# Patient Record
Sex: FEMALE | Race: WHITE | Employment: FULL TIME | URBAN - NONMETROPOLITAN AREA
[De-identification: names, ages, dates, MRNs, and addresses within clinical notes are randomized per-mention and may not be internally consistent; named-entity substitution may affect disease eponyms.]

---

## 2018-01-03 ENCOUNTER — OFFICE VISIT (OUTPATIENT)
Dept: RHEUMATOLOGY | Age: 23
End: 2018-01-03
Payer: COMMERCIAL

## 2018-01-03 VITALS
HEART RATE: 96 BPM | DIASTOLIC BLOOD PRESSURE: 63 MMHG | HEIGHT: 61 IN | RESPIRATION RATE: 16 BRPM | WEIGHT: 128.2 LBS | SYSTOLIC BLOOD PRESSURE: 110 MMHG | BODY MASS INDEX: 24.2 KG/M2

## 2018-01-03 DIAGNOSIS — R76.8 RHEUMATOID FACTOR POSITIVE: ICD-10-CM

## 2018-01-03 DIAGNOSIS — M25.50 POLYARTHRALGIA: Primary | ICD-10-CM

## 2018-01-03 PROCEDURE — 99244 OFF/OP CNSLTJ NEW/EST MOD 40: CPT | Performed by: INTERNAL MEDICINE

## 2018-01-03 RX ORDER — SULFAMETHOXAZOLE AND TRIMETHOPRIM 800; 160 MG/1; MG/1
1 TABLET ORAL EVERY 12 HOURS
COMMUNITY
End: 2018-02-15 | Stop reason: ALTCHOICE

## 2018-01-03 RX ORDER — PREDNISONE 10 MG/1
TABLET ORAL
Qty: 30 TABLET | Refills: 0 | Status: SHIPPED | OUTPATIENT
Start: 2018-01-03 | End: 2018-02-15 | Stop reason: ALTCHOICE

## 2018-01-03 RX ORDER — CEPHALEXIN 500 MG/1
500 CAPSULE ORAL 2 TIMES DAILY
COMMUNITY
End: 2018-02-15 | Stop reason: ALTCHOICE

## 2018-01-03 ASSESSMENT — ENCOUNTER SYMPTOMS
RESPIRATORY NEGATIVE: 1
EYES NEGATIVE: 1
GASTROINTESTINAL NEGATIVE: 1

## 2018-01-05 LAB
ALBUMIN SERPL-MCNC: 3.7 G/DL
ALP BLD-CCNC: 64 U/L
ALT SERPL-CCNC: 16 U/L
ANION GAP SERPL CALCULATED.3IONS-SCNC: 12 MMOL/L
AST SERPL-CCNC: 18 U/L
BASOPHILS ABSOLUTE: NORMAL /ΜL
BASOPHILS RELATIVE PERCENT: NORMAL %
BILIRUB SERPL-MCNC: 0.3 MG/DL (ref 0.1–1.4)
BUN BLDV-MCNC: 12 MG/DL
CALCIUM SERPL-MCNC: 8.5 MG/DL
CHLORIDE BLD-SCNC: 105 MMOL/L
CO2: 27 MMOL/L
CREAT SERPL-MCNC: 0.8 MG/DL
EOSINOPHILS ABSOLUTE: NORMAL /ΜL
EOSINOPHILS RELATIVE PERCENT: NORMAL %
GFR CALCULATED: NORMAL
GLUCOSE BLD-MCNC: 82 MG/DL
HCT VFR BLD CALC: 37.6 % (ref 36–46)
HEMOGLOBIN: 13 G/DL (ref 12–16)
LYMPHOCYTES ABSOLUTE: 2.3 /ΜL
LYMPHOCYTES RELATIVE PERCENT: 40.1 %
MCH RBC QN AUTO: 29 PG
MCHC RBC AUTO-ENTMCNC: 34.6 G/DL
MCV RBC AUTO: 83.7 FL
MONOCYTES ABSOLUTE: NORMAL /ΜL
MONOCYTES RELATIVE PERCENT: NORMAL %
NEUTROPHILS ABSOLUTE: 3 /ΜL
NEUTROPHILS RELATIVE PERCENT: 51.7 %
PDW BLD-RTO: 39.1 %
PLATELET # BLD: 178 K/ΜL
PMV BLD AUTO: 9.3 FL
POTASSIUM SERPL-SCNC: 3.6 MMOL/L
RBC # BLD: 4.49 10^6/ΜL
RHEUMATOID FACTOR: 14
SODIUM BLD-SCNC: 140 MMOL/L
TOTAL PROTEIN: 7.2
WBC # BLD: 5.8 10^3/ML

## 2018-01-09 ENCOUNTER — TELEPHONE (OUTPATIENT)
Dept: RHEUMATOLOGY | Age: 23
End: 2018-01-09

## 2018-01-09 NOTE — TELEPHONE ENCOUNTER
----- Message from Alexis Estrada DO sent at 1/8/2018  5:34 PM EST -----  Please call and inform the pt that the x-ray of the hand revealed no significant abnormalities. The blood testing was negative for systemic lupus. The other blood test to evaluate for rheumatoid arthritis cyclic citrullinated peptide (CCP) was negative. The screening test for viral hepatitis was negative. The blood test to evaluate for inflammation was within normal limits. Please ask the patient if the prednisone taper helped with the joint pains. If she has noted relief how much relief has she had?

## 2018-01-09 NOTE — TELEPHONE ENCOUNTER
Pt notified and she states she has not really noticed a difference with the joint pains since taking the prednisone taper and she has been taking it since Friday.  Please advise

## 2018-02-15 ENCOUNTER — OFFICE VISIT (OUTPATIENT)
Dept: RHEUMATOLOGY | Age: 23
End: 2018-02-15
Payer: COMMERCIAL

## 2018-02-15 VITALS
DIASTOLIC BLOOD PRESSURE: 63 MMHG | BODY MASS INDEX: 24.13 KG/M2 | HEART RATE: 98 BPM | SYSTOLIC BLOOD PRESSURE: 106 MMHG | WEIGHT: 127.8 LBS | RESPIRATION RATE: 16 BRPM | HEIGHT: 61 IN

## 2018-02-15 DIAGNOSIS — Z51.81 MEDICATION MONITORING ENCOUNTER: Primary | ICD-10-CM

## 2018-02-15 DIAGNOSIS — Z00.00 HEALTHCARE MAINTENANCE: ICD-10-CM

## 2018-02-15 DIAGNOSIS — M05.79 RHEUMATOID ARTHRITIS INVOLVING MULTIPLE SITES WITH POSITIVE RHEUMATOID FACTOR (HCC): ICD-10-CM

## 2018-02-15 PROCEDURE — 99214 OFFICE O/P EST MOD 30 MIN: CPT | Performed by: INTERNAL MEDICINE

## 2018-02-15 PROCEDURE — 96372 THER/PROPH/DIAG INJ SC/IM: CPT | Performed by: INTERNAL MEDICINE

## 2018-02-15 RX ORDER — METHYLPREDNISOLONE ACETATE 80 MG/ML
80 INJECTION, SUSPENSION INTRA-ARTICULAR; INTRALESIONAL; INTRAMUSCULAR; SOFT TISSUE ONCE
Status: COMPLETED | OUTPATIENT
Start: 2018-02-15 | End: 2018-02-15

## 2018-02-15 RX ORDER — OLOPATADINE HYDROCHLORIDE 2 MG/ML
1 SOLUTION/ DROPS OPHTHALMIC DAILY
COMMUNITY
End: 2021-09-13

## 2018-02-15 RX ORDER — SULFASALAZINE 500 MG/1
TABLET ORAL
Qty: 120 TABLET | Refills: 0 | Status: SHIPPED | OUTPATIENT
Start: 2018-02-15 | End: 2018-03-05 | Stop reason: SDUPTHER

## 2018-02-15 RX ADMIN — METHYLPREDNISOLONE ACETATE 80 MG: 80 INJECTION, SUSPENSION INTRA-ARTICULAR; INTRALESIONAL; INTRAMUSCULAR; SOFT TISSUE at 14:01

## 2018-02-15 ASSESSMENT — ENCOUNTER SYMPTOMS
RESPIRATORY NEGATIVE: 1
GASTROINTESTINAL NEGATIVE: 1
EYES NEGATIVE: 1

## 2018-02-15 NOTE — PROGRESS NOTES
PAST MEDICAL HISTORY  Past Medical History:   Diagnosis Date    Arthritis, rheumatoid ([address]        SOCIAL HISTORY  Social History     Social History    Marital status: Legally      Spouse name: N/A    Number of children: N/A    Years of education: N/A     Social History Main Topics    Smoking status: Never Smoker    Smokeless tobacco: Never Used    Alcohol use No    Drug use: No    Sexual activity: Not Asked     Other Topics Concern    None     Social History Narrative    None       FAMILY HISTORY  Family History   Problem Relation Age of Onset    No Known Problems Mother     High Blood Pressure Father     Arthritis Father     Lupus Maternal Grandmother     Lupus Paternal Grandmother     Arthritis Paternal Grandmother        SURGICAL HISTORY  Past Surgical History:   Procedure Laterality Date    WRIST ARTHROSCOPY Left 04/2017       ALLERGIES  No Known Allergies    CURRENT MEDICATIONS  Current Outpatient Prescriptions   Medication Sig Dispense Refill    olopatadine (PATADAY) 0.2 % SOLN ophthalmic solution 1 drop daily As needed       No current facility-administered medications for this visit. Objective:  /63   Pulse 98   Resp 16   Ht 5' 0.98\" (1.549 m)   Wt 127 lb 12.8 oz (58 kg)   BMI 24.16 kg/m²     General: No distress. Alert. Eyes: PERRL. No sclera icterus. No conjunctival injection. ENT: No discharge. Pharynx clear. Neck: Trachea midline. Normal thyroid. Resp: No accessory muscle use. No crackles. No wheezing. No rhonchi. No dullness on percussion. CV: Regular rate. Regular rhythm. No mumur or rub. No edema. M/S:   Upper extremities:  Muscle strength 5/5, FROM, No Synovitis   Fingers: Tender PIP Right 2-5, Left 2-5. - swelling of the right PIP # 2-4, Left # 2-4   - apposition of the Rt thumb to forwarm     Elbows: tender right tochlear notch.     - hyperextension of the left elbow     Lower Extremities:   Muscle strength 5/5, FROM, No Synovitis, warmth,redness of the hips, knees, ankles, toes. - Feet: mild bone spurring along the posterior calcaneous bilaterally. Spine:   - - able to place hand on ground from standing position. - tenderness along the right lumbar perispinal musculature and left SI joint. Negative shober, occiput to wall and b/l josette testing. Neuro: DTR's 2/4 bilat and equal  Psych: Oriented to person, place, time. No anxiety or agitation. Skin: Warm and dry. No nodule on exposed extremities. - mild thicken skin along the intertriginous region of the right  right 4-5 hand and foot  Lymph: No cervical LAD. No supraclavicular LAD. RAPID 3 14.3    LABS:  CBC  Lab Results   Component Value Date    WBC 5.8 01/05/2018    RBC 4.49 01/05/2018    HGB 13.0 01/05/2018    HCT 37.6 01/05/2018    MCV 83.7 01/05/2018    MCH 29.0 01/05/2018    MCHC 34.6 01/05/2018    RDW 39.1 01/05/2018     01/05/2018       CMP  Lab Results   Component Value Date    CALCIUM 8.5 01/05/2018    LABALBU 3.7 01/05/2018     01/05/2018    K 3.6 01/05/2018    CO2 27 01/05/2018     01/05/2018    BUN 12 01/05/2018    CREATININE 0.8 01/05/2018    ALT 16 01/05/2018    AST 18 01/05/2018       HgBA1c: No components found for: HGBA1C    No results found for: TSHFT4, TSH  No results found for: VITD25      No results found for: ANASCRN  No results found for: SSA  No results found for: SSB  No results found for: ANTI-SMITH  No results found for: DSDNAAB   No results found for: ANTIRNP  No results found for: C3, C4  No results found for: CCPAB  Lab Results   Component Value Date    RF 14.0 01/05/2018       No components found for: CANCASCRN, APANCASCRN  No results found for: SEDRATE  No results found for: CRP    RADIOLOGY:   MRI: arthrogram 8/4/16:   - smalll 6mm belobed gnglion cyst extends volar and proximal to the scpholunate inteval. 2 the scapholunate and lunotrquetrial ligaments appear intact. 3. Otherwise normal left wrist MRI. No acute farcture, AVN, or bone marrow edema. RF: 14 (<13.9)  CRP 1.2 (0-3)  Uric acid 4.5  ESR: 5    RF 14 (< 13.9), negative CCP, SSA, SSB, RNP, JENNIFER, smith, DsDNA. Negative Vital hepatitis screen     XR right hand 1/5/18: normal x-ray evaluation   Assessment/ Plan:    Rheumatoid factor: acute flare.   -  (+) : very mild (+) , + joint tenderness and swelling @ PIP joints   The patient reports joint pain in fingers, wrist, lower back. Most severe pain in the wrist and fingers. Symptoms started: 2015 with pain, along the left thumb/index finger pain. She reports the joint pains progressively worsened and since spread. Recurrent bouts of joint swelling, redness,warmth. AM stiffness lasting 6-8 hours. AM stiffness improved w/ movement, Naproxen,. Recent left wrist Surgery by dr. Pat Ruiz in April 2017 and pt diagnosed with synovitis and fraide cartilage.   - MGM and PGM: SLE   - negative JENNIFER and subserologies, negative CCP   - normal ESR/CRP    - start SSZ 500mg bid then increase to 1000mg bid. Side effects of sulfasalazine discussed include but not limited to allergic reaction including De La Vega-Yovani, hepatotoxicity, nephrotoxicity, pancreatitis, cytopenias including bone marrow suppression, GI upset (nausea, vomiting, abdominal pain), LFT elevation/hepatic injury, photosensitivity, stomatitis. -  Holding on Arava, MTX    -  depomedrol 80mg IM today. Medication monitoring:   - cbc, cmp, esr, crp q 3-4 weeks with SSI initiation and titration     Health maintenance:   - discussed pneumococcal vaccinations     ? Benign hypmobility syndrome:   - pt with 3 of 9 Beighton score               No Follow-up on file. Electronically signed by Cari Echevarria DO on 2/15/2018 at 1:29 PM      Thank you for allowing me to participate in the care of this patient. Please call if there are any questions.

## 2018-02-23 LAB
ALBUMIN SERPL-MCNC: 3.9 G/DL
ALP BLD-CCNC: 59 U/L
ALT SERPL-CCNC: 17 U/L
ANION GAP SERPL CALCULATED.3IONS-SCNC: 1.1 MMOL/L
AST SERPL-CCNC: 14 U/L
BASOPHILS ABSOLUTE: 0.02 /ΜL
BASOPHILS RELATIVE PERCENT: 0.3 %
BILIRUB SERPL-MCNC: 0.4 MG/DL (ref 0.1–1.4)
BUN BLDV-MCNC: 13 MG/DL
C-REACTIVE PROTEIN: 0.8
CALCIUM SERPL-MCNC: 8.8 MG/DL
CHLORIDE BLD-SCNC: 104 MMOL/L
CO2: 27 MMOL/L
CREAT SERPL-MCNC: 0.7 MG/DL
EOSINOPHILS ABSOLUTE: 0.16 /ΜL
EOSINOPHILS RELATIVE PERCENT: 2.2 %
GFR CALCULATED: NORMAL
GLUCOSE BLD-MCNC: 91 MG/DL
HCT VFR BLD CALC: 39.7 % (ref 36–46)
HEMOGLOBIN: 13.1 G/DL (ref 12–16)
LYMPHOCYTES ABSOLUTE: 2.08 /ΜL
LYMPHOCYTES RELATIVE PERCENT: 28 %
MCH RBC QN AUTO: 28.6 PG
MCHC RBC AUTO-ENTMCNC: 33 G/DL
MCV RBC AUTO: 86.7 FL
MONOCYTES ABSOLUTE: 0.69 /ΜL
MONOCYTES RELATIVE PERCENT: 9.3 %
NEUTROPHILS ABSOLUTE: 4.48 /ΜL
NEUTROPHILS RELATIVE PERCENT: 60.2 %
PDW BLD-RTO: 37.4 %
PLATELET # BLD: 193 K/ΜL
PMV BLD AUTO: 10.2 FL
POTASSIUM SERPL-SCNC: 3.6 MMOL/L
RBC # BLD: 4.58 10^6/ΜL
SEDIMENTATION RATE, ERYTHROCYTE: 2
SODIUM BLD-SCNC: 141 MMOL/L
TOTAL PROTEIN: 7.5
WBC # BLD: 7.43 10^3/ML

## 2018-03-02 LAB
ALBUMIN SERPL-MCNC: 3.8 G/DL
ALP BLD-CCNC: 55 U/L
ALT SERPL-CCNC: 17 U/L
ANION GAP SERPL CALCULATED.3IONS-SCNC: 1.1 MMOL/L
AST SERPL-CCNC: 15 U/L
BASOPHILS ABSOLUTE: 0.03 /ΜL
BASOPHILS RELATIVE PERCENT: 0.5 %
BILIRUB SERPL-MCNC: 0.5 MG/DL (ref 0.1–1.4)
BUN BLDV-MCNC: 11 MG/DL
C-REACTIVE PROTEIN: 0.9
CALCIUM SERPL-MCNC: 8.4 MG/DL
CHLORIDE BLD-SCNC: 105 MMOL/L
CO2: 27 MMOL/L
CREAT SERPL-MCNC: 0.8 MG/DL
EOSINOPHILS ABSOLUTE: 0.09 /ΜL
EOSINOPHILS RELATIVE PERCENT: 1.5 %
GFR CALCULATED: NORMAL
GLUCOSE BLD-MCNC: 125 MG/DL
HCT VFR BLD CALC: 38 % (ref 36–46)
HEMOGLOBIN: 12.4 G/DL (ref 12–16)
LYMPHOCYTES ABSOLUTE: 1.69 /ΜL
LYMPHOCYTES RELATIVE PERCENT: 28.3 %
MCH RBC QN AUTO: 28.6 PG
MCHC RBC AUTO-ENTMCNC: 32.6 G/DL
MCV RBC AUTO: 87.6 FL
MONOCYTES ABSOLUTE: 0.54 /ΜL
MONOCYTES RELATIVE PERCENT: 9 %
NEUTROPHILS ABSOLUTE: 3.62 /ΜL
NEUTROPHILS RELATIVE PERCENT: 60.5 %
PDW BLD-RTO: 38.5 %
PLATELET # BLD: 156 K/ΜL
PMV BLD AUTO: 10.2 FL
POTASSIUM SERPL-SCNC: 3.1 MMOL/L
RBC # BLD: 4.34 10^6/ΜL
SEDIMENTATION RATE, ERYTHROCYTE: 2
SODIUM BLD-SCNC: 137 MMOL/L
TOTAL PROTEIN: 7.3
WBC # BLD: 5.98 10^3/ML

## 2018-03-05 DIAGNOSIS — M05.79 RHEUMATOID ARTHRITIS INVOLVING MULTIPLE SITES WITH POSITIVE RHEUMATOID FACTOR (HCC): ICD-10-CM

## 2018-03-05 RX ORDER — SULFASALAZINE 500 MG/1
TABLET ORAL
Qty: 120 TABLET | Refills: 0 | Status: SHIPPED | OUTPATIENT
Start: 2018-03-05 | End: 2018-03-29 | Stop reason: SDUPTHER

## 2018-03-05 NOTE — PROGRESS NOTES
Labs revealed a mild low potassium and calcium levels.  LAbs otherwise stable  - continue SSZ 1,000mg bid

## 2018-03-09 LAB
ALBUMIN SERPL-MCNC: 4.1 G/DL
ALP BLD-CCNC: 59 U/L
ALT SERPL-CCNC: 20 U/L
ANION GAP SERPL CALCULATED.3IONS-SCNC: 10 MMOL/L
AST SERPL-CCNC: 17 U/L
BASOPHILS ABSOLUTE: 0.02 /ΜL
BASOPHILS RELATIVE PERCENT: 0.4 %
BILIRUB SERPL-MCNC: 0.5 MG/DL (ref 0.1–1.4)
BUN BLDV-MCNC: 11 MG/DL
C-REACTIVE PROTEIN: 1.1
CALCIUM SERPL-MCNC: 8.9 MG/DL
CHLORIDE BLD-SCNC: 104 MMOL/L
CO2: 29 MMOL/L
CREAT SERPL-MCNC: 0.8 MG/DL
EOSINOPHILS ABSOLUTE: 0.07 /ΜL
EOSINOPHILS RELATIVE PERCENT: 1.5 %
GFR CALCULATED: NORMAL
GLUCOSE BLD-MCNC: 67 MG/DL
HCT VFR BLD CALC: 39.4 % (ref 36–46)
HEMOGLOBIN: 12.9 G/DL (ref 12–16)
LYMPHOCYTES ABSOLUTE: 1.58 /ΜL
LYMPHOCYTES RELATIVE PERCENT: 33.5 %
MCH RBC QN AUTO: 28.8 PG
MCHC RBC AUTO-ENTMCNC: 32.7 G/DL
MCV RBC AUTO: 87.9 FL
MONOCYTES ABSOLUTE: 0.61 /ΜL
MONOCYTES RELATIVE PERCENT: 13 %
NEUTROPHILS ABSOLUTE: 2.42 /ΜL
NEUTROPHILS RELATIVE PERCENT: 51.4 %
PDW BLD-RTO: 39.7 %
PLATELET # BLD: 155 K/ΜL
PMV BLD AUTO: 10.1 FL
POTASSIUM SERPL-SCNC: 3.4 MMOL/L
RBC # BLD: 4.48 10^6/ΜL
SEDIMENTATION RATE, ERYTHROCYTE: 6
SODIUM BLD-SCNC: 140 MMOL/L
TOTAL PROTEIN: 7.8
WBC # BLD: 4.71 10^3/ML

## 2018-03-21 ENCOUNTER — OFFICE VISIT (OUTPATIENT)
Dept: RHEUMATOLOGY | Age: 23
End: 2018-03-21
Payer: COMMERCIAL

## 2018-03-21 VITALS
DIASTOLIC BLOOD PRESSURE: 63 MMHG | OXYGEN SATURATION: 97 % | HEIGHT: 61 IN | WEIGHT: 125.8 LBS | HEART RATE: 78 BPM | BODY MASS INDEX: 23.75 KG/M2 | SYSTOLIC BLOOD PRESSURE: 102 MMHG

## 2018-03-21 DIAGNOSIS — Z51.81 MEDICATION MONITORING ENCOUNTER: ICD-10-CM

## 2018-03-21 DIAGNOSIS — M05.79 RHEUMATOID ARTHRITIS INVOLVING MULTIPLE SITES WITH POSITIVE RHEUMATOID FACTOR (HCC): Primary | ICD-10-CM

## 2018-03-21 DIAGNOSIS — M65.4 DE QUERVAIN'S TENOSYNOVITIS, BILATERAL: ICD-10-CM

## 2018-03-21 PROCEDURE — 99214 OFFICE O/P EST MOD 30 MIN: CPT | Performed by: INTERNAL MEDICINE

## 2018-03-21 ASSESSMENT — ENCOUNTER SYMPTOMS
EYES NEGATIVE: 1
GASTROINTESTINAL NEGATIVE: 1
RESPIRATORY NEGATIVE: 1

## 2018-03-21 NOTE — PATIENT INSTRUCTIONS
your doctor if you are having problems. It's also a good idea to know your test results and keep a list of the medicines you take. Where can you learn more? Go to [email or web address] and sign in to your [employer] account. Enter A924 in the Zuli box to learn more about \"De Quervain's Disease: Exercises. \"     If you do not have an account, please click on the \"Sign Up Now\" link. Current as of: March 21, 2017  Content Version: 11.5  © 2765-2038 [employer] ([employer]). Care instructions adapted under license by [facility] ([facility]). If you have questions about a medical condition or this instruction, always ask your healthcare professional. [address] any warranty or liability for your use of this information. Patient Education        Amy Vaughn Tenosynovitis: Care Instructions  Your Care Instructions  Amy Vaughn (say \"keb-thzk-LTT\") tenosynovitis is a problem that makes the bottom of your thumb and the side of your wrist hurt. When you have de Quervain's, the ropey fiber (tendon) that helps move your thumb away from your fingers becomes swollen. You may have pain when you move your wrist or pick things up. You may hear a creaking sound when you move your wrist or thumb. Symptoms often get better in a few weeks with home care. Your doctor may want you to start some gentle stretching exercises once your symptoms are gone. Sometimes treatment with an injection or surgery is needed. Follow-up care is a key part of your treatment and safety. Be sure to make and go to all appointments, and call your doctor if you are having problems. It's also a good idea to know your test results and keep a list of the medicines you take. How can you care for yourself at home? · Until your symptoms are better, stop the activities that caused the pain. · Avoid moving the hand and wrist that hurt.   · Follow your doctor's directions for wearing a splint to keep your

## 2018-03-21 NOTE — PROGRESS NOTES
[facility]  Rheumatology Adult Follow up Note       3/21/2018  MRN: [id number]    HPI:   Sandie Mcleod  is a(n)22 y.o. female with a hx of rheumatoid arthritis (dx'ed 2017)  Here for the f/u of her rheumatoid arthritis. symptoms started: 2015 with pain, along the left thumb/index finger pain. She reports the joint pains progressively worsened and since spread. Recurrent bouts of joint swelling, redness,warmth. AM stiffness lasting 6-8 hours. AM stiffness improved w/ movement, Naproxen,. Recent left wrist Surgery by dr. Mani Ward in April 2017 and pt diagnosed with synovitis and fraide cartilage. PT with prior [place]opedics evaluations in [place] and at [facility]. - SSZ started with no relief noted. Headaches if not taking with foot. Joint pains currently affecting the wrist and hands today. Pain 4.5/10. Pain, localized, intermittent, daily. Timing: Mornings. Aggravating factors: increased use, weather changes, cold weather. Alleviating factors: Naproxen (mild relief). , compression gloves. Joint swelling of the bilaterall fingers and wrists  AM stiffness > 60 minutes, improved w/ warmth. Current therapy:   Naproxen 500mg bid prn. SSZ 1000mg bid (Jan 2018)     Previous therapy:   prednisone (no relief),   steroid wrist injection (9/2016:increased pain),   physical therapy left wrist, lower back (no relief)        ROS:  Review of Systems   Constitutional: Negative. HENT: Negative. Eyes: Negative. Respiratory: Negative. Cardiovascular: Negative. Gastrointestinal: Negative. Genitourinary: Negative. Musculoskeletal: Positive for myalgias (in the hands). Skin: Negative. Neurological: Negative for tingling. Endo/Heme/Allergies: Negative. Psychiatric/Behavioral: Negative for depression. The patient has insomnia. The patient is not nervous/anxious.         PAST MEDICAL HISTORY  Past Medical History:   Diagnosis Date    Arthritis, rheumatoid the hips, knees, ankles, toes. - Feet: mild bone spurring along the posterior calcaneous bilaterally. Spine:   - - able to place hand on ground from standing position. - tenderness along the right lumbar perispinal musculature and left SI joint. Negative shober, occiput to wall and b/l josette testing. Neuro: DTR's 2/4 bilat and equal  Psych: Oriented to person, place, time. No anxiety or agitation. Skin: Warm and dry. No nodule on exposed extremities. - mild thicken skin along the intertriginous region of the right  right 4-5 hand and foot  Lymph: No cervical LAD. No supraclavicular LAD. RAPID 3 9    LABS:  CBC  Lab Results   Component Value Date    WBC 4.71 03/09/2018    RBC 4.48 03/09/2018    HGB 12.9 03/09/2018    HCT 39.4 03/09/2018    MCV 87.9 03/09/2018    MCH 28.8 03/09/2018    MCHC 32.7 03/09/2018    RDW 39.7 03/09/2018     03/09/2018       CMP  Lab Results   Component Value Date    CALCIUM 8.9 03/09/2018    LABALBU 4.1 03/09/2018     03/09/2018    K 3.4 03/09/2018    CO2 29 03/09/2018     03/09/2018    BUN 11 03/09/2018    CREATININE 0.8 03/09/2018    ALT 20 03/09/2018    AST 17 03/09/2018       HgBA1c: No components found for: HGBA1C    No results found for: TSHFT4, TSH  No results found for: VITD25      No results found for: ANASCRN  No results found for: SSA  No results found for: SSB  No results found for: ANTI-SMITH  No results found for: DSDNAAB   No results found for: ANTIRNP  No results found for: C3, C4  No results found for: CCPAB  Lab Results   Component Value Date    RF 14.0 01/05/2018       No components found for: CANCASCRN, APANCASCRN  Lab Results   Component Value Date    SEDRATE 6 03/09/2018     Lab Results   Component Value Date    CRP 1.1 03/09/2018       RADIOLOGY:   MRI: arthrogram 8/4/16:   - smalll 6mm belobed gnglion cyst extends volar and proximal to the scpholunate inteval. 2 the scapholunate and lunotrquetrial ligaments appear intact.  3. patient. Please call if there are any questions.

## 2018-03-29 ENCOUNTER — TELEPHONE (OUTPATIENT)
Dept: RHEUMATOLOGY | Age: 23
End: 2018-03-29

## 2018-03-29 DIAGNOSIS — M05.79 RHEUMATOID ARTHRITIS INVOLVING MULTIPLE SITES WITH POSITIVE RHEUMATOID FACTOR (HCC): ICD-10-CM

## 2018-03-29 RX ORDER — SULFASALAZINE 500 MG/1
1500 TABLET ORAL 2 TIMES DAILY
Qty: 180 TABLET | Refills: 0 | Status: SHIPPED | OUTPATIENT
Start: 2018-03-29 | End: 2019-01-24 | Stop reason: ALTCHOICE

## 2018-03-29 NOTE — TELEPHONE ENCOUNTER
Patient is requesting a prescription for sulfasalazine 500 mg, she stated it was increased to three tablets two times a day. Varinder in [facility]. DOLV 3/21/18, DONV 5/23/18.

## 2018-10-25 ENCOUNTER — TELEPHONE (OUTPATIENT)
Dept: RHEUMATOLOGY | Age: 23
End: 2018-10-25

## 2018-10-25 NOTE — TELEPHONE ENCOUNTER
Do you have a day and time for patient to come in and discuss medication changes? Or would you like to contact patient and discuss medication changes. Please advise.

## 2018-10-25 NOTE — TELEPHONE ENCOUNTER
Patient is wanting to make an appt to discuss a medication change. Sulfasalazine is causing her to have migraines. Would you be the one who would prescribe the birth control if you change her medication to something else or would she need to get that from her PCP? Please advise patient.

## 2019-01-24 ENCOUNTER — OFFICE VISIT (OUTPATIENT)
Dept: RHEUMATOLOGY | Age: 24
End: 2019-01-24
Payer: COMMERCIAL

## 2019-01-24 VITALS
HEIGHT: 61 IN | WEIGHT: 134.4 LBS | HEART RATE: 89 BPM | TEMPERATURE: 97.1 F | BODY MASS INDEX: 25.37 KG/M2 | SYSTOLIC BLOOD PRESSURE: 119 MMHG | OXYGEN SATURATION: 96 % | DIASTOLIC BLOOD PRESSURE: 69 MMHG

## 2019-01-24 DIAGNOSIS — M05.79 RHEUMATOID ARTHRITIS INVOLVING MULTIPLE SITES WITH POSITIVE RHEUMATOID FACTOR (HCC): Primary | ICD-10-CM

## 2019-01-24 DIAGNOSIS — M65.4 DE QUERVAIN'S TENOSYNOVITIS, BILATERAL: ICD-10-CM

## 2019-01-24 DIAGNOSIS — Z51.81 MEDICATION MONITORING ENCOUNTER: ICD-10-CM

## 2019-01-24 PROCEDURE — 96372 THER/PROPH/DIAG INJ SC/IM: CPT | Performed by: INTERNAL MEDICINE

## 2019-01-24 PROCEDURE — 99214 OFFICE O/P EST MOD 30 MIN: CPT | Performed by: INTERNAL MEDICINE

## 2019-01-24 RX ORDER — AZATHIOPRINE 50 MG/1
TABLET ORAL
Qty: 60 TABLET | Refills: 0 | Status: SHIPPED | OUTPATIENT
Start: 2019-01-24 | End: 2019-02-26 | Stop reason: SDUPTHER

## 2019-01-24 RX ORDER — METHYLPREDNISOLONE ACETATE 80 MG/ML
80 INJECTION, SUSPENSION INTRA-ARTICULAR; INTRALESIONAL; INTRAMUSCULAR; SOFT TISSUE ONCE
Status: COMPLETED | OUTPATIENT
Start: 2019-01-24 | End: 2019-01-24

## 2019-01-24 RX ADMIN — METHYLPREDNISOLONE ACETATE 80 MG: 80 INJECTION, SUSPENSION INTRA-ARTICULAR; INTRALESIONAL; INTRAMUSCULAR; SOFT TISSUE at 08:30

## 2019-02-18 LAB
BASOPHILS ABSOLUTE: 0.02 /ΜL
BASOPHILS RELATIVE PERCENT: 0.4 %
EOSINOPHILS ABSOLUTE: 0.21 /ΜL
EOSINOPHILS RELATIVE PERCENT: 3.8 %
HCT VFR BLD CALC: 41.6 % (ref 36–46)
HEMOGLOBIN: 13.7 G/DL (ref 12–16)
LYMPHOCYTES ABSOLUTE: 1.57 /ΜL
LYMPHOCYTES RELATIVE PERCENT: 28.8 %
MCH RBC QN AUTO: 29.3 PG
MCHC RBC AUTO-ENTMCNC: 32.9 G/DL
MCV RBC AUTO: 89.1 FL
MONOCYTES ABSOLUTE: 0.39 /ΜL
MONOCYTES RELATIVE PERCENT: 7.1 %
NEUTROPHILS ABSOLUTE: 3.26 /ΜL
NEUTROPHILS RELATIVE PERCENT: 59.7 %
PDW BLD-RTO: 43.6 %
PLATELET # BLD: 214 K/ΜL
PMV BLD AUTO: 9.8 FL
RBC # BLD: 4.67 10^6/ΜL
WBC # BLD: 5.46 10^3/ML

## 2019-02-21 DIAGNOSIS — M05.79 RHEUMATOID ARTHRITIS INVOLVING MULTIPLE SITES WITH POSITIVE RHEUMATOID FACTOR (HCC): ICD-10-CM

## 2019-02-21 NOTE — TELEPHONE ENCOUNTER
Hernan Gao called requesting a refill on the following medications:  Requested Prescriptions     Pending Prescriptions Disp Refills    azaTHIOprine (IMURAN) 50 MG tablet 60 tablet 0     Sig: Take 1 tablet daily x 7 days then increase to 1 tablet twice daily     Pharmacy verified:  [facility]      Date of last visit: 1/24/19  Date of next visit (if applicable): [id number]/94/4877

## 2019-02-22 LAB
ALBUMIN SERPL-MCNC: 3.7 G/DL
ALP BLD-CCNC: 58 U/L
ALT SERPL-CCNC: 19 U/L
ANION GAP SERPL CALCULATED.3IONS-SCNC: 1 MMOL/L
AST SERPL-CCNC: 13 U/L
BILIRUB SERPL-MCNC: 0.5 MG/DL (ref 0.1–1.4)
BUN BLDV-MCNC: 12 MG/DL
C-REACTIVE PROTEIN: 0.9
CALCIUM SERPL-MCNC: 9.2 MG/DL
CHLORIDE BLD-SCNC: 105 MMOL/L
CO2: 28 MMOL/L
CREAT SERPL-MCNC: 0.8 MG/DL
GFR CALCULATED: 94
GLUCOSE BLD-MCNC: 71 MG/DL
POTASSIUM SERPL-SCNC: 3.7 MMOL/L
SEDIMENTATION RATE, ERYTHROCYTE: 5
SODIUM BLD-SCNC: 141 MMOL/L
TOTAL PROTEIN: 7.5

## 2019-02-25 NOTE — TELEPHONE ENCOUNTER
Patient states she had her labs done Friday at [facility]. I do not see any results. She was calling to request refill again.

## 2019-02-26 DIAGNOSIS — M05.79 RHEUMATOID ARTHRITIS INVOLVING MULTIPLE SITES WITH POSITIVE RHEUMATOID FACTOR (HCC): ICD-10-CM

## 2019-02-26 RX ORDER — AZATHIOPRINE 50 MG/1
50 TABLET ORAL 2 TIMES DAILY
Qty: 60 TABLET | Refills: 0 | Status: SHIPPED | OUTPATIENT
Start: 2019-02-26 | End: 2019-04-29 | Stop reason: SDUPTHER

## 2019-02-26 RX ORDER — AZATHIOPRINE 50 MG/1
50 TABLET ORAL 2 TIMES DAILY
Qty: 60 TABLET | Refills: 0 | OUTPATIENT
Start: 2019-02-26

## 2019-03-11 ENCOUNTER — TELEPHONE (OUTPATIENT)
Dept: PHYSICAL MEDICINE AND REHAB | Age: 24
End: 2019-03-11

## 2019-03-21 DIAGNOSIS — M05.79 RHEUMATOID ARTHRITIS INVOLVING MULTIPLE SITES WITH POSITIVE RHEUMATOID FACTOR (HCC): Primary | ICD-10-CM

## 2019-03-21 RX ORDER — PREDNISONE 1 MG/1
5 TABLET ORAL DAILY
Qty: 30 TABLET | Refills: 1 | Status: SHIPPED | OUTPATIENT
Start: 2019-03-21 | End: 2019-04-09 | Stop reason: SDUPTHER

## 2019-04-08 LAB
ALBUMIN SERPL-MCNC: 3.3 G/DL
ALP BLD-CCNC: 49 U/L
ALT SERPL-CCNC: 18 U/L
ANION GAP SERPL CALCULATED.3IONS-SCNC: 1 MMOL/L
AST SERPL-CCNC: 14 U/L
BASOPHILS ABSOLUTE: 0.01 /ΜL
BASOPHILS RELATIVE PERCENT: 0.1 %
BILIRUB SERPL-MCNC: 0.3 MG/DL (ref 0.1–1.4)
BUN BLDV-MCNC: 11 MG/DL
C-REACTIVE PROTEIN: 0.5
CALCIUM SERPL-MCNC: 8.4 MG/DL
CHLORIDE BLD-SCNC: 104 MMOL/L
CO2: 26 MMOL/L
CREAT SERPL-MCNC: 0.6 MG/DL
EOSINOPHILS ABSOLUTE: 0.09 /ΜL
EOSINOPHILS RELATIVE PERCENT: 1 %
GFR CALCULATED: NORMAL
GLUCOSE BLD-MCNC: 91 MG/DL
HCT VFR BLD CALC: 40.2 % (ref 36–46)
HEMOGLOBIN: 13.7 G/DL (ref 12–16)
LYMPHOCYTES ABSOLUTE: 2.09 /ΜL
LYMPHOCYTES RELATIVE PERCENT: 23.8 %
MCH RBC QN AUTO: 29.4 PG
MCHC RBC AUTO-ENTMCNC: 34.1 G/DL
MCV RBC AUTO: 86.3 FL
MONOCYTES ABSOLUTE: 0.79 /ΜL
MONOCYTES RELATIVE PERCENT: 9 %
NEUTROPHILS ABSOLUTE: 5.77 /ΜL
NEUTROPHILS RELATIVE PERCENT: 65.8 %
PDW BLD-RTO: 40.7 %
PLATELET # BLD: 172 K/ΜL
PMV BLD AUTO: 10 FL
POTASSIUM SERPL-SCNC: 3.4 MMOL/L
RBC # BLD: 4.66 10^6/ΜL
SEDIMENTATION RATE, ERYTHROCYTE: 6
SODIUM BLD-SCNC: 136 MMOL/L
TOTAL PROTEIN: 6.7
WBC # BLD: 8.78 10^3/ML

## 2019-04-09 DIAGNOSIS — M05.79 RHEUMATOID ARTHRITIS INVOLVING MULTIPLE SITES WITH POSITIVE RHEUMATOID FACTOR (HCC): ICD-10-CM

## 2019-04-09 RX ORDER — PREDNISONE 1 MG/1
5 TABLET ORAL DAILY
Qty: 90 TABLET | Refills: 1 | Status: SHIPPED | OUTPATIENT
Start: 2019-04-09 | End: 2019-04-29 | Stop reason: SDUPTHER

## 2019-04-29 ENCOUNTER — OFFICE VISIT (OUTPATIENT)
Dept: RHEUMATOLOGY | Age: 24
End: 2019-04-29
Payer: COMMERCIAL

## 2019-04-29 VITALS
SYSTOLIC BLOOD PRESSURE: 102 MMHG | DIASTOLIC BLOOD PRESSURE: 68 MMHG | HEART RATE: 79 BPM | OXYGEN SATURATION: 99 % | WEIGHT: 141.4 LBS | BODY MASS INDEX: 26.7 KG/M2 | HEIGHT: 61 IN

## 2019-04-29 DIAGNOSIS — M05.79 RHEUMATOID ARTHRITIS INVOLVING MULTIPLE SITES WITH POSITIVE RHEUMATOID FACTOR (HCC): ICD-10-CM

## 2019-04-29 DIAGNOSIS — Z51.81 MEDICATION MONITORING ENCOUNTER: ICD-10-CM

## 2019-04-29 DIAGNOSIS — M65.4 DE QUERVAIN'S TENOSYNOVITIS, BILATERAL: Primary | ICD-10-CM

## 2019-04-29 PROCEDURE — 99214 OFFICE O/P EST MOD 30 MIN: CPT | Performed by: INTERNAL MEDICINE

## 2019-04-29 RX ORDER — AZATHIOPRINE 50 MG/1
50 TABLET ORAL 2 TIMES DAILY
Qty: 60 TABLET | Refills: 0 | Status: SHIPPED | OUTPATIENT
Start: 2019-04-29 | End: 2019-05-30 | Stop reason: SDUPTHER

## 2019-04-29 RX ORDER — PREDNISONE 2.5 MG
2.5 TABLET ORAL DAILY
Qty: 90 TABLET | Refills: 1 | Status: SHIPPED | OUTPATIENT
Start: 2019-04-29 | End: 2019-07-15

## 2019-04-29 ASSESSMENT — ENCOUNTER SYMPTOMS
NAUSEA: 0
CONSTIPATION: 0
VOMITING: 0
WHEEZING: 0
SHORTNESS OF BREATH: 0
COUGH: 0
EYE REDNESS: 0
EYE PAIN: 0
EYES NEGATIVE: 1
DIARRHEA: 0

## 2019-04-29 NOTE — PROGRESS NOTES
[facility] RHEUMATOLOGY FOLLOW UP NOTE       Date Of Service: 4/29/2019  Provider: Dina Collins DO    Name: Nicole Huang   MRN: [id number]    Chief Complaint(s)     Chief Complaint   Patient presents with    3 Month Follow-Up        History of Present Illness (HPI)     Nicole Huang  is a(n)23 y.o. female with a hx of hx of rheumatoid arthritis (dx'ed 2017)  Here for the f/u of her rheumatoid arthritis. symptoms started: 2015 with pain, along the left thumb/index finger pain. She reports the joint pains progressively worsened and since spread. Recurrent bouts of joint swelling, redness,warmth. AM stiffness lasting 6-8 hours. AM stiffness improved w/ movement, Naproxen,. Recent left wrist Surgery by dr. Mary Spring in April 2017 and pt diagnosed with synovitis and fraide cartilage. PT with prior orthopedics evaluations in [facility] [place] and at [facility]. Interval hx:    -  worsening pain in the hands and wrists x 1 day    - stopped prednisone 3 days ago,     The patient reports joint pain in b/l figners, wrists, Most severe in hte hands, Pain up to 5.5/10, intermittent, daily, dull/   Timing: Mornings. Aggravating factors: increased use, weather changes, cold weather. Alleviating factors: Naproxen (mild relief). , compression gloves.      Joint swelling of the bilaterall fingers   AM stiffness > 3 hours  improved w/ warmth. Current therapy:   Naproxen 500mg bid prn.        Previous therapy:   prednisone (no relief),   steroid wrist injection (9/2016:increased pain),   physical therapy left wrist, lower back (no relief)    SSZ 1000mg bid (Jan 2018)     REVIEW OF SYSTEMS: (ROS)    Review of Systems   Constitutional: Negative for diaphoresis, fatigue and fever. HENT: Negative for congestion, hearing loss and nosebleeds. Eyes: Negative. Negative for pain and redness. Respiratory: Negative for cough, shortness of breath and wheezing. Cardiovascular: Negative. Negative for chest pain. Gastrointestinal: Negative for constipation, diarrhea, nausea and vomiting. Genitourinary: Negative for difficulty urinating, frequency and hematuria. Musculoskeletal: Negative for myalgias. Skin: Negative for rash. Neurological: Positive for numbness. Negative for dizziness, weakness and headaches. Hematological: Does not bruise/bleed easily. Psychiatric/Behavioral: Negative for sleep disturbance. The patient is not nervous/anxious. Constitutional:  Denies- Fevers, Chills, Night sweats,  Weight gain/loss,  + Fatigue,   ALLERGIES:  Denies - Reactions. EYES:  Denies-  Vision changes, Blurred vision, Photophobia, Dry eyes   ENT:  Denies: sinus pain, congestion, + dry eyes. CARDIOVASCULAR Denies: Chest Pain, Dyspnea on exertion, Orthopnea,  Edema, Palpitations   RESPIRATORY Denies: Wheezing, Cough,  Shortness of breath, Pleuritic Chest pain,      ENDOCRINE:  Denies: Polydipsia, Polyuria, Skin changes, Temperature Intolerance     GI:  Denies: Abdominal pain, Nausea, Vomiting, Diarrhea, Constipation,    :  Denies:  Urinary changes, dysuria    NEURO:  Denies: Dizzy, Vertigo, Headache, Focal weakness, weakness + Numbness/tingling,    MUSCULOSKELETAL  AS ABOVE    HEMATOLOGY:  Denies: Bruising easily , CVA HX, H/o Blood clots. SKIN:  Denies: Rash, Photophobia ,  Skin thickening, Digital ulceration , Raynauds   PSYCH:  Denies: Anxiety, Depression, Insomnia, Suicidal thoughts, Feeling Blue.       PAST MEDICAL HISTORY      Past Medical History:   Diagnosis Date    Arthritis, rheumatoid ([address]        PAST SURGICAL HISTORY      Past Surgical History:   Procedure Laterality Date    WRIST ARTHROSCOPY Left 04/2017       FAMILY HISTORY      Family History   Problem Relation Age of Onset    No Known Problems Mother     High Blood Pressure Father     Arthritis Father     Lupus Maternal Grandmother     Lupus Paternal Grandmother     Arthritis Paternal Grandmother        SOCIAL HISTORY      Social History     Tobacco History     Smoking Status  Never Smoker    Smokeless Tobacco Use  Never Used          Alcohol History     Alcohol Use Status  Yes Comment  occas          Drug Use     Drug Use Status  No          Sexual Activity     Sexually Active  Yes                ALLERGIES   No Known Allergies    CURRENT MEDICATIONS      Current Outpatient Medications   Medication Sig Dispense Refill    predniSONE (DELTASONE) 5 MG tablet Take 1 tablet by mouth daily 90 tablet 1    olopatadine (PATADAY) 0.2 % SOLN ophthalmic solution 1 drop daily As needed      azaTHIOprine (IMURAN) 50 MG tablet Take 1 tablet by mouth 2 times daily 60 tablet 0     No current facility-administered medications for this visit. PHYSICAL EXAMINATION / OBJECTIVE     Objective:  /68 (Site: Left Upper Arm, Position: Sitting, Cuff Size: Medium Adult)   Pulse 79   Ht 5' 0.98\" (1.549 m)   Wt 141 lb 6.4 oz (64.1 kg)   SpO2 99%   BMI 26.73 kg/m²     Physical Exam   Constitutional: She is oriented to person, place, and time. She appears well-developed and well-nourished. HENT:   Head: Normocephalic. Eyes: Pupils are equal, round, and reactive to light. EOM are normal.   Neck: Normal range of motion. Neck supple. Cardiovascular: Normal rate, regular rhythm and normal heart sounds. Pulmonary/Chest: Effort normal and breath sounds normal. No respiratory distress. She has no wheezes. She has no rales. Lymphadenopathy:     She has no cervical adenopathy. Neurological: She is alert and oriented to person, place, and time. Skin: Skin is warm and dry. - mild thicken skin along the intertriginous region of the right  right 4-5 hand and foot   Psychiatric: She has a normal mood and affect.  Her behavior is normal.     Upper extremities:  Muscle strength 5/5, FROM, No Synovitis   Fingers: Tender thumbs , PIPs Rt 2,3, left 2-3              - swelling of the right PIP # 2-3, Left # 2-3              - (+) finkelstein testing-- left Elbows: tender right tochlear notch. - hyperextension of the left elbow      Lower Extremities:   Muscle strength 5/5, FROM, No Synovitis, warmth,redness of the hips, knees, ankles, toes. - Feet: MTP compression testing. RAPID3 Composite Score  4/29/2019 --- RAPID 3: 0 + 5.5 + 5.5 = 11     RAPID3 Total Score: 8.7      Remission: <3  Low Disease Activity: <6  Moderate Disease Activity: >=6 and <=12  High Disease Activity: >12     LABS      CBC  Lab Results   Component Value Date    WBC 8.78 04/08/2019    RBC 4.66 04/08/2019    HGB 13.7 04/08/2019    HCT 40.2 04/08/2019    MCV 86.3 04/08/2019    MCH 29.4 04/08/2019    MCHC 34.1 04/08/2019    RDW 40.7 04/08/2019     04/08/2019       CMP  Lab Results   Component Value Date    CALCIUM 8.4 04/08/2019    LABALBU 3.3 04/08/2019     04/08/2019    K 3.4 04/08/2019    CO2 26 04/08/2019     04/08/2019    BUN 11 04/08/2019    CREATININE 0.6 04/08/2019    ALKPHOS 49 04/08/2019    ALT 18 04/08/2019    AST 14 04/08/2019       HgBA1c: No components found for: HGBA1C    No results found for: TSHFT4, TSH  No results found for: VITD25      No results found for: ANASCRN  No results found for: SSA  No results found for: SSB  No results found for: ANTI-SMITH  No results found for: DSDNAAB   No results found for: ANTIRNP  No results found for: C3, C4  No results found for: CCPAB  Lab Results   Component Value Date    RF 14.0 01/05/2018       No components found for: CANCASCRN, APANCASCRN  Lab Results   Component Value Date    SEDRATE 6 04/08/2019     Lab Results   Component Value Date    CRP 0.5 04/08/2019       RADIOLOGY:         ASSESSMENT/PLAN    Assessment   Plan   There are no diagnoses linked to this encounter. Symptoms started: 2015 with pain, along the left thumb/index finger pain. She reports the joint pains progressively worsened and since spread. Recurrent bouts of joint swelling, redness,warmth.  AM stiffness lasting 6-8 hours. AM stiffness improved w/ movement, Naproxen,. Recent left wrist Surgery by dr. Monster Sanders in April 2017 and pt diagnosed with synovitis and fraide cartilage. Rheumatoid arthritis- (+) RF : very mild (+) , + joint tenderness and swelling @ PIP joints , > 6 wks duration, normal ESR/CRP   - MGM and PGM: SLE  - Prior tx :  sulfasalazine to 1500mg bid b/c headache     - avoid MTX and Arava b/c Cat. X -- currently pregnant. - restart imuran 50mg  bid. --- pregnancy cat. C -- continued joint swelling, tenderness and worsened left thumb pain. - restart prednisone 2.5 (5mg reports wt gain)   - labs q 4 weeks     ? Benign hypmobility syndrome:   - pt with 3 of 9 Beighton score       De Quervain's tenosynovitis: b/l felt to be related to # 1  - + Finkelstein's test and painful resisted abduction of the thumb  - Discussed trial of thumb splinting/wrist splinting initially  - Information provided provided to patient on treatment options and diagnosis  - Other treatment options discussed included hand therapy referral, referral to  orthopedic surgery, and trinity-tendon injections     Medication monitoring:   - cbc, cmp, esr, crp q 3-4 weeks with imuran initiation and titration      Health maintenance:   - pt declined pneumococcal vaccinations     No follow-ups on file. Electronically signed by Mariely Guillory DO on 4/29/2019 at 8:54 AM    New Prescriptions    No medications on file       Thankyou for allowing me to participate in the care of this patient. Please call if there are any questions.

## 2019-05-08 ENCOUNTER — TELEPHONE (OUTPATIENT)
Dept: RHEUMATOLOGY | Age: 24
End: 2019-05-08

## 2019-05-08 NOTE — TELEPHONE ENCOUNTER
Patient called stating she went to her OBGYN (Dr. Marcus Mtz) because of a continuous migraine. At her visit they prescribed her Fioricet. She wanted to clarify that this is an okay medication to take with her rheumatology medications. Please advise, thanks!

## 2019-05-28 LAB
ALBUMIN SERPL-MCNC: 3.2 G/DL
ALP BLD-CCNC: 51 U/L
ALT SERPL-CCNC: 18 U/L
ANION GAP SERPL CALCULATED.3IONS-SCNC: 0.9 MMOL/L
AST SERPL-CCNC: 18 U/L
BASOPHILS ABSOLUTE: 0.01 /ΜL
BASOPHILS RELATIVE PERCENT: 0.1 %
BILIRUB SERPL-MCNC: 0.3 MG/DL (ref 0.1–1.4)
BUN BLDV-MCNC: 7 MG/DL
C-REACTIVE PROTEIN: 4.2
CALCIUM SERPL-MCNC: 9 MG/DL
CHLORIDE BLD-SCNC: 105 MMOL/L
CO2: 27 MMOL/L
CREAT SERPL-MCNC: 0.7 MG/DL
EOSINOPHILS ABSOLUTE: 0.1 /ΜL
EOSINOPHILS RELATIVE PERCENT: 1.4 %
GFR CALCULATED: NORMAL
GLUCOSE BLD-MCNC: 66 MG/DL
HCT VFR BLD CALC: 38.9 % (ref 36–46)
HEMOGLOBIN: 13.3 G/DL (ref 12–16)
LYMPHOCYTES ABSOLUTE: 1.43 /ΜL
LYMPHOCYTES RELATIVE PERCENT: 20.2 %
MCH RBC QN AUTO: 29.8 PG
MCHC RBC AUTO-ENTMCNC: 34.2 G/DL
MCV RBC AUTO: 87 FL
MONOCYTES ABSOLUTE: 0.57 /ΜL
MONOCYTES RELATIVE PERCENT: 8.1 %
NEUTROPHILS ABSOLUTE: 4.95 /ΜL
NEUTROPHILS RELATIVE PERCENT: 70.1 %
PDW BLD-RTO: 40.8 %
PLATELET # BLD: 192 K/ΜL
PMV BLD AUTO: 9.8 FL
POTASSIUM SERPL-SCNC: 3.6 MMOL/L
RBC # BLD: 4.47 10^6/ΜL
SEDIMENTATION RATE, ERYTHROCYTE: 11
SODIUM BLD-SCNC: 138 MMOL/L
TOTAL PROTEIN: 6.9
WBC # BLD: 7.07 10^3/ML

## 2019-05-30 DIAGNOSIS — M05.79 RHEUMATOID ARTHRITIS INVOLVING MULTIPLE SITES WITH POSITIVE RHEUMATOID FACTOR (HCC): ICD-10-CM

## 2019-05-30 RX ORDER — AZATHIOPRINE 50 MG/1
50 TABLET ORAL 2 TIMES DAILY
Qty: 60 TABLET | Refills: 0 | Status: SHIPPED | OUTPATIENT
Start: 2019-05-30 | End: 2019-07-01 | Stop reason: SDUPTHER

## 2019-06-28 LAB
ALBUMIN SERPL-MCNC: 2.6 G/DL
ALP BLD-CCNC: 54 U/L
ALT SERPL-CCNC: 14 U/L
ANION GAP SERPL CALCULATED.3IONS-SCNC: 15 MMOL/L
AST SERPL-CCNC: 21 U/L
BASOPHILS ABSOLUTE: 0.01 /ΜL
BASOPHILS RELATIVE PERCENT: 0.1 %
BILIRUB SERPL-MCNC: 0.3 MG/DL (ref 0.1–1.4)
BUN BLDV-MCNC: 8 MG/DL
C-REACTIVE PROTEIN: 3.9
CALCIUM SERPL-MCNC: 7.8 MG/DL
CHLORIDE BLD-SCNC: 109 MMOL/L
CO2: 24 MMOL/L
CREAT SERPL-MCNC: 0.6 MG/DL
EOSINOPHILS ABSOLUTE: 0.17 /ΜL
EOSINOPHILS RELATIVE PERCENT: 2.5 %
GFR CALCULATED: NORMAL
GLUCOSE BLD-MCNC: 81 MG/DL
HCT VFR BLD CALC: 35.7 % (ref 36–46)
HEMOGLOBIN: 12.2 G/DL (ref 12–16)
LYMPHOCYTES ABSOLUTE: 1.65 /ΜL
LYMPHOCYTES RELATIVE PERCENT: 23.8 %
MCH RBC QN AUTO: 30.4 PG
MCHC RBC AUTO-ENTMCNC: 34.2 G/DL
MCV RBC AUTO: 89 FL
MONOCYTES ABSOLUTE: 0.54 /ΜL
MONOCYTES RELATIVE PERCENT: 7.8 %
NEUTROPHILS ABSOLUTE: 4.54 /ΜL
NEUTROPHILS RELATIVE PERCENT: 65.7 %
PDW BLD-RTO: 40.7 %
PLATELET # BLD: 191 K/ΜL
PMV BLD AUTO: 10.1 FL
POTASSIUM SERPL-SCNC: 3.5 MMOL/L
RBC # BLD: 4.01 10^6/ΜL
SEDIMENTATION RATE, ERYTHROCYTE: 14
SODIUM BLD-SCNC: 144 MMOL/L
TOTAL PROTEIN: 6.3
WBC # BLD: 6.92 10^3/ML

## 2019-07-01 DIAGNOSIS — M05.79 RHEUMATOID ARTHRITIS INVOLVING MULTIPLE SITES WITH POSITIVE RHEUMATOID FACTOR (HCC): ICD-10-CM

## 2019-07-01 RX ORDER — AZATHIOPRINE 50 MG/1
50 TABLET ORAL 2 TIMES DAILY
Qty: 60 TABLET | Refills: 0 | Status: SHIPPED | OUTPATIENT
Start: 2019-07-01 | End: 2019-07-15 | Stop reason: SDUPTHER

## 2019-07-15 ENCOUNTER — OFFICE VISIT (OUTPATIENT)
Dept: RHEUMATOLOGY | Age: 24
End: 2019-07-15
Payer: COMMERCIAL

## 2019-07-15 VITALS
WEIGHT: 142.8 LBS | OXYGEN SATURATION: 98 % | BODY MASS INDEX: 26.96 KG/M2 | DIASTOLIC BLOOD PRESSURE: 62 MMHG | HEART RATE: 74 BPM | HEIGHT: 61 IN | SYSTOLIC BLOOD PRESSURE: 110 MMHG

## 2019-07-15 DIAGNOSIS — M65.4 DE QUERVAIN'S TENOSYNOVITIS, BILATERAL: Primary | ICD-10-CM

## 2019-07-15 DIAGNOSIS — Z51.81 MEDICATION MONITORING ENCOUNTER: ICD-10-CM

## 2019-07-15 DIAGNOSIS — M54.40 CHRONIC MIDLINE LOW BACK PAIN WITH SCIATICA, SCIATICA LATERALITY UNSPECIFIED: ICD-10-CM

## 2019-07-15 DIAGNOSIS — M72.2 BILATERAL PLANTAR FASCIITIS: ICD-10-CM

## 2019-07-15 DIAGNOSIS — M05.79 RHEUMATOID ARTHRITIS INVOLVING MULTIPLE SITES WITH POSITIVE RHEUMATOID FACTOR (HCC): ICD-10-CM

## 2019-07-15 DIAGNOSIS — G89.29 CHRONIC MIDLINE LOW BACK PAIN WITH SCIATICA, SCIATICA LATERALITY UNSPECIFIED: ICD-10-CM

## 2019-07-15 PROCEDURE — G8419 CALC BMI OUT NRM PARAM NOF/U: HCPCS | Performed by: INTERNAL MEDICINE

## 2019-07-15 PROCEDURE — 99214 OFFICE O/P EST MOD 30 MIN: CPT | Performed by: INTERNAL MEDICINE

## 2019-07-15 PROCEDURE — 1036F TOBACCO NON-USER: CPT | Performed by: INTERNAL MEDICINE

## 2019-07-15 PROCEDURE — G8427 DOCREV CUR MEDS BY ELIG CLIN: HCPCS | Performed by: INTERNAL MEDICINE

## 2019-07-15 RX ORDER — AZATHIOPRINE 50 MG/1
50 TABLET ORAL 2 TIMES DAILY
Qty: 60 TABLET | Refills: 0 | Status: SHIPPED | OUTPATIENT
Start: 2019-07-15 | End: 2019-08-06 | Stop reason: SDUPTHER

## 2019-07-15 RX ORDER — SERTRALINE HYDROCHLORIDE 25 MG/1
25 TABLET, FILM COATED ORAL DAILY
COMMUNITY
End: 2021-09-13 | Stop reason: CLARIF

## 2019-07-15 RX ORDER — PREDNISONE 2.5 MG
5 TABLET ORAL DAILY
Qty: 90 TABLET | Refills: 1 | Status: SHIPPED | OUTPATIENT
Start: 2019-07-15 | End: 2019-08-06 | Stop reason: SDUPTHER

## 2019-07-15 ASSESSMENT — ENCOUNTER SYMPTOMS
COUGH: 0
EYE REDNESS: 0
NAUSEA: 0
EYE PAIN: 0
CONSTIPATION: 0
VOMITING: 0
DIARRHEA: 0
EYES NEGATIVE: 1
WHEEZING: 0
SHORTNESS OF BREATH: 0
BACK PAIN: 1

## 2019-07-15 NOTE — PROGRESS NOTES
ophthalmic solution 1 drop daily As needed       No current facility-administered medications for this visit. PHYSICAL EXAMINATION / OBJECTIVE     Objective:  /62 (Site: Right Upper Arm, Position: Sitting, Cuff Size: Medium Adult)   Pulse 74   Ht 5' 0.98\" (1.549 m)   Wt 142 lb 12.8 oz (64.8 kg)   SpO2 98%   BMI 27.00 kg/m²     Physical Exam   Constitutional: She is oriented to person, place, and time. She appears well-developed and well-nourished. HENT:   Head: Normocephalic. Eyes: Pupils are equal, round, and reactive to light. EOM are normal.   Neck: Normal range of motion. Neck supple. Cardiovascular: Normal rate, regular rhythm and normal heart sounds. Pulmonary/Chest: Effort normal and breath sounds normal. No respiratory distress. She has no wheezes. She has no rales. Lymphadenopathy:     She has no cervical adenopathy. Neurological: She is alert and oriented to person, place, and time. Skin: Skin is warm and dry. - mild thicken skin along the intertriginous region of the right  right 4-5 hand and foot   Psychiatric: She has a normal mood and affect. Her behavior is normal.     Upper extremities:  Muscle strength 5/5, FROM, No Synovitis   Fingers: Tender thumbs , PIPs Rt 2,3,4  Left 2-3              - swelling of the right PIP # 2,3,4 , Left # 2,3              - (+) finkelstein testing-- left                            Elbows: tender right tochlear notch. - hyperextension of the left elbow      Lower Extremities:   Muscle strength 5/5, FROM, No Synovitis, warmth,redness of the hips, knees, ankles, toes. - Feet: MTP compression testing.      RAPID3 Composite Score  7/15/2019 --- RAPID 3: 2.3 + 5.5 + 2.5 = 12.3    RAPID3 Total Score: 8.7      Remission: <3  Low Disease Activity: <6  Moderate Disease Activity: >=6 and <=12  High Disease Activity: >12     LABS      CBC  Lab Results   Component Value Date    WBC 6.92 06/28/2019    RBC 4.01 06/28/2019    HGB 12.2 06/28/2019    HCT 35.7 06/28/2019    MCV 89.0 06/28/2019    MCH 30.4 06/28/2019    MCHC 34.2 06/28/2019    RDW 40.7 06/28/2019     06/28/2019       CMP  Lab Results   Component Value Date    CALCIUM 7.8 06/28/2019    LABALBU 2.6 06/28/2019     06/28/2019    K 3.5 06/28/2019    CO2 24 06/28/2019     06/28/2019    BUN 8 06/28/2019    CREATININE 0.6 06/28/2019    ALKPHOS 54 06/28/2019    ALT 14 06/28/2019    AST 21 06/28/2019       HgBA1c: No components found for: HGBA1C    No results found for: TSHFT4, TSH  No results found for: VITD25      No results found for: ANASCRN  No results found for: SSA  No results found for: SSB  No results found for: ANTI-SMITH  No results found for: DSDNAAB   No results found for: ANTIRNP  No results found for: C3, C4  No results found for: CCPAB  Lab Results   Component Value Date    RF 14.0 01/05/2018       No components found for: CANCASCRN, APANCASCRN  Lab Results   Component Value Date    SEDRATE 14 06/28/2019     Lab Results   Component Value Date    CRP 3.9 06/28/2019       RADIOLOGY:         ASSESSMENT/PLAN    Assessment   Plan   There are no diagnoses linked to this encounter. Symptoms started: 2015 with pain, along the left thumb/index finger pain. She reports the joint pains progressively worsened and since spread. Recurrent bouts of joint swelling, redness,warmth. AM stiffness lasting 6-8 hours. AM stiffness improved w/ movement, Naproxen,. Recent left wrist Surgery by dr. Lolly Roth in April 2017 and pt diagnosed with synovitis and fraide cartilage. Rheumatoid arthritis-   + RF  + joint tenderness and swelling @ PIP joints , > 6 wks duration, nml   ESR & CRP   - MGM and PGM: SLE  - Prior tx :  sulfasalazine to 1500mg bid b/c headache     - avoid MTX and Arava b/c Cat. X -- currently pregnant. - restart prednisone 5mg daily    - imuran to 50mg bid -- discussed dosing titration   - labs q 4 weeks     ?  Benign hypmobility syndrome:   - pt with 3 of 9

## 2019-07-15 NOTE — PATIENT INSTRUCTIONS
Patient Education        Plantar Fasciitis: Exercises  Your Care Instructions  Here are some examples of typical rehabilitation exercises for your condition. Start each exercise slowly. Ease off the exercise if you start to have pain. Your doctor or physical therapist will tell you when you can start these exercises and which ones will work best for you. How to do the exercises  Towel stretch    1. Sit with your legs extended and knees straight. 2. Place a towel around your foot just under the toes. 3. Hold each end of the towel in each hand, with your hands above your knees. 4. Pull back with the towel so that your foot stretches toward you. 5. Hold the position for at least 15 to 30 seconds. 6. Repeat 2 to 4 times a session, up to 5 sessions a day. Calf stretch    1. Stand facing a wall with your hands on the wall at about eye level. Put the leg you want to stretch about a step behind your other leg. 2. Keeping your back heel on the floor, bend your front knee until you feel a stretch in the back leg. 3. Hold the stretch for 15 to 30 seconds. Repeat 2 to 4 times. Plantar fascia and calf stretch    1. Stand on a step as shown above. Be sure to hold on to the banister. 2. Slowly let your heels down over the edge of the step as you relax your calf muscles. You should feel a gentle stretch across the bottom of your foot and up the back of your leg to your knee. 3. Hold the stretch about 15 to 30 seconds, and then tighten your calf muscle a little to bring your heel back up to the level of the step. Repeat 2 to 4 times. Towel curls    1. While sitting, place your foot on a towel on the floor and scrunch the towel toward you with your toes. 2. Then, also using your toes, push the towel away from you. Waite pickups    1. Put marbles on the floor next to a cup.  2. Using your toes, try to lift the marbles up from the floor and put them in the cup.     Follow-up care is a key part of your treatment and safety. Be sure to make and go to all appointments, and call your doctor if you are having problems. It's also a good idea to know your test results and keep a list of the medicines you take. Where can you learn more? Go to [email or web address] and sign in to your [employer] account. Enter B404 in the All My Data box to learn more about \"Plantar Fasciitis: Exercises. \"     If you do not have an account, please click on the \"Sign Up Now\" link. Current as of: September 20, 2018  Content Version: 12.0  © 6722-0602 [employer]. Care instructions adapted under license by [facility] ([facility]). If you have questions about a medical condition or this instruction, always ask your healthcare professional. [address] any warranty or liability for your use of this information. Patient Education        Plantar Fasciitis: Care Instructions  Your Care Instructions    Plantar fasciitis is pain and inflammation of the plantar fascia, the tissue at the bottom of your foot that connects the heel bone to the toes. The plantar fascia also supports the arch. If you strain the plantar fascia, it can develop small tears and cause heel pain when you stand or walk. Plantar fasciitis can be caused by running or other sports. It also may occur in people who are overweight or who have high arches or flat feet. You may get plantar fasciitis if you walk or stand for long periods, or have a tight Achilles tendon or calf muscles. You can improve your foot pain with rest and other care at home. It might take a few weeks to a few months for your foot to heal completely. Follow-up care is a key part of your treatment and safety. Be sure to make and go to all appointments, and call your doctor if you are having problems. It's also a good idea to know your test results and keep a list of the medicines you take. How can you care for yourself at home?   · Rest your feet often. Reduce your activity to a level that lets you avoid pain. If possible, do not run or walk on hard surfaces. · Take pain medicines exactly as directed. ? If the doctor gave you a prescription medicine for pain, take it as prescribed. ? If you are not taking a prescription pain medicine, take an over-the-counter anti-inflammatory medicine for pain and swelling, such as ibuprofen (Advil, Motrin) or naproxen (Aleve). Read and follow all instructions on the label. · Use ice massage to help with pain and swelling. You can use an ice cube or an ice cup several times a day. To make an ice cup, fill a paper cup with water and freeze it. Cut off the top of the cup until a half-inch of ice shows. Hold onto the remaining paper to use the cup. Rub the ice in small circles over the area for 5 to 7 minutes. · Contrast baths, which alternate hot and cold water, can also help reduce swelling. But because heat alone may make pain and swelling worse, end a contrast bath with a soak in cold water. · Wear a night splint if your doctor suggests it. A night splint holds your foot with the toes pointed up and the foot and ankle at a 90-degree angle. This position gives the bottom of your foot a constant, gentle stretch. · Do simple exercises such as calf stretches and towel stretches 2 to 3 times each day, especially when you first get up in the morning. These can help the plantar fascia become more flexible. They also make the muscles that support your arch stronger. Hold these stretches for 15 to 30 seconds per stretch. Repeat 2 to 4 times. ? Stand about 1 foot from a wall. Place the palms of both hands against the wall at chest level. Lean forward against the wall, keeping one leg with the knee straight and heel on the ground while bending the knee of the other leg.  ? Sit down on the floor or a mat with your feet stretched in front of you. Roll up a towel lengthwise, and loop it over the ball of your foot.  Holding the

## 2019-08-05 LAB
ALBUMIN SERPL-MCNC: 2.6 G/DL
ALP BLD-CCNC: 73 U/L
ALT SERPL-CCNC: 19 U/L
ANION GAP SERPL CALCULATED.3IONS-SCNC: 0.7 MMOL/L
AST SERPL-CCNC: 17 U/L
BASOPHILS ABSOLUTE: 0.01 /ΜL
BASOPHILS RELATIVE PERCENT: 0.1 %
BILIRUB SERPL-MCNC: 0.3 MG/DL (ref 0.1–1.4)
BUN BLDV-MCNC: 5 MG/DL
C-REACTIVE PROTEIN: 2.5
CALCIUM SERPL-MCNC: 8.7 MG/DL
CHLORIDE BLD-SCNC: 105 MMOL/L
CO2: 26 MMOL/L
CREAT SERPL-MCNC: 0.6 MG/DL
EOSINOPHILS ABSOLUTE: 0.07 /ΜL
EOSINOPHILS RELATIVE PERCENT: 0.8 %
GFR CALCULATED: 132
GLUCOSE BLD-MCNC: 75 MG/DL
HCT VFR BLD CALC: 37.6 % (ref 36–46)
HEMOGLOBIN: 12.5 G/DL (ref 12–16)
LYMPHOCYTES ABSOLUTE: 1.33 /ΜL
LYMPHOCYTES RELATIVE PERCENT: 15.8 %
MCH RBC QN AUTO: 30.1 PG
MCHC RBC AUTO-ENTMCNC: 33.2 G/DL
MCV RBC AUTO: 90.6 FL
MONOCYTES ABSOLUTE: 0.56 /ΜL
MONOCYTES RELATIVE PERCENT: 6.7 %
NEUTROPHILS ABSOLUTE: 6.41 /ΜL
NEUTROPHILS RELATIVE PERCENT: 76.2 %
PDW BLD-RTO: 43.8 %
PLATELET # BLD: 200 K/ΜL
PMV BLD AUTO: 9.7 FL
POTASSIUM SERPL-SCNC: 3.1 MMOL/L
RBC # BLD: 4.15 10^6/ΜL
SEDIMENTATION RATE, ERYTHROCYTE: 14
SODIUM BLD-SCNC: 140 MMOL/L
TOTAL PROTEIN: 6.3
WBC # BLD: 8.41 10^3/ML

## 2019-08-06 DIAGNOSIS — M05.79 RHEUMATOID ARTHRITIS INVOLVING MULTIPLE SITES WITH POSITIVE RHEUMATOID FACTOR (HCC): ICD-10-CM

## 2019-08-06 RX ORDER — POTASSIUM CHLORIDE 20 MEQ/1
20 TABLET, EXTENDED RELEASE ORAL 2 TIMES DAILY
Qty: 10 TABLET | Refills: 0 | Status: SHIPPED | OUTPATIENT
Start: 2019-08-06 | End: 2021-03-03

## 2019-08-06 RX ORDER — PREDNISONE 2.5 MG
5 TABLET ORAL DAILY
Qty: 90 TABLET | Refills: 1 | Status: SHIPPED
Start: 2019-08-06 | End: 2020-10-22 | Stop reason: SDUPTHER

## 2019-08-06 RX ORDER — AZATHIOPRINE 50 MG/1
50 TABLET ORAL 2 TIMES DAILY
Qty: 60 TABLET | Refills: 0 | Status: SHIPPED | OUTPATIENT
Start: 2019-08-06 | End: 2019-10-15 | Stop reason: SDUPTHER

## 2019-10-15 ENCOUNTER — OFFICE VISIT (OUTPATIENT)
Dept: RHEUMATOLOGY | Age: 24
End: 2019-10-15
Payer: COMMERCIAL

## 2019-10-15 VITALS
DIASTOLIC BLOOD PRESSURE: 62 MMHG | SYSTOLIC BLOOD PRESSURE: 98 MMHG | BODY MASS INDEX: 29.27 KG/M2 | HEIGHT: 61 IN | WEIGHT: 155 LBS | OXYGEN SATURATION: 98 % | HEART RATE: 79 BPM

## 2019-10-15 DIAGNOSIS — Z51.81 MEDICATION MONITORING ENCOUNTER: ICD-10-CM

## 2019-10-15 DIAGNOSIS — M72.2 PLANTAR FASCIITIS: ICD-10-CM

## 2019-10-15 DIAGNOSIS — M05.79 RHEUMATOID ARTHRITIS INVOLVING MULTIPLE SITES WITH POSITIVE RHEUMATOID FACTOR (HCC): ICD-10-CM

## 2019-10-15 DIAGNOSIS — M65.4 DE QUERVAIN'S TENOSYNOVITIS, BILATERAL: Primary | ICD-10-CM

## 2019-10-15 PROCEDURE — G8419 CALC BMI OUT NRM PARAM NOF/U: HCPCS | Performed by: INTERNAL MEDICINE

## 2019-10-15 PROCEDURE — G8427 DOCREV CUR MEDS BY ELIG CLIN: HCPCS | Performed by: INTERNAL MEDICINE

## 2019-10-15 PROCEDURE — 99214 OFFICE O/P EST MOD 30 MIN: CPT | Performed by: INTERNAL MEDICINE

## 2019-10-15 PROCEDURE — G8484 FLU IMMUNIZE NO ADMIN: HCPCS | Performed by: INTERNAL MEDICINE

## 2019-10-15 PROCEDURE — 1036F TOBACCO NON-USER: CPT | Performed by: INTERNAL MEDICINE

## 2019-10-15 RX ORDER — AZATHIOPRINE 50 MG/1
75 TABLET ORAL 2 TIMES DAILY
Qty: 90 TABLET | Refills: 0 | Status: SHIPPED | OUTPATIENT
Start: 2019-10-15 | End: 2020-10-26

## 2019-10-15 ASSESSMENT — ENCOUNTER SYMPTOMS
SHORTNESS OF BREATH: 0
DIARRHEA: 0
NAUSEA: 0
WHEEZING: 0
VOMITING: 0
EYE REDNESS: 0
CONSTIPATION: 0
EYE PAIN: 0
COUGH: 0
BACK PAIN: 1
EYES NEGATIVE: 1

## 2019-12-17 ENCOUNTER — OFFICE VISIT (OUTPATIENT)
Dept: RHEUMATOLOGY | Age: 24
End: 2019-12-17
Payer: COMMERCIAL

## 2019-12-17 VITALS
HEART RATE: 64 BPM | HEIGHT: 61 IN | OXYGEN SATURATION: 98 % | RESPIRATION RATE: 16 BRPM | DIASTOLIC BLOOD PRESSURE: 60 MMHG | WEIGHT: 120 LBS | SYSTOLIC BLOOD PRESSURE: 110 MMHG | BODY MASS INDEX: 22.66 KG/M2

## 2019-12-17 DIAGNOSIS — M65.4 DE QUERVAIN'S TENOSYNOVITIS, BILATERAL: ICD-10-CM

## 2019-12-17 DIAGNOSIS — Z51.81 MEDICATION MONITORING ENCOUNTER: ICD-10-CM

## 2019-12-17 DIAGNOSIS — M72.2 PLANTAR FASCIITIS: ICD-10-CM

## 2019-12-17 DIAGNOSIS — M54.40 CHRONIC MIDLINE LOW BACK PAIN WITH SCIATICA, SCIATICA LATERALITY UNSPECIFIED: ICD-10-CM

## 2019-12-17 DIAGNOSIS — G89.29 CHRONIC MIDLINE LOW BACK PAIN WITH SCIATICA, SCIATICA LATERALITY UNSPECIFIED: ICD-10-CM

## 2019-12-17 DIAGNOSIS — M05.79 RHEUMATOID ARTHRITIS INVOLVING MULTIPLE SITES WITH POSITIVE RHEUMATOID FACTOR (HCC): Primary | ICD-10-CM

## 2019-12-17 PROCEDURE — 99214 OFFICE O/P EST MOD 30 MIN: CPT | Performed by: NURSE PRACTITIONER

## 2019-12-17 PROCEDURE — G8420 CALC BMI NORM PARAMETERS: HCPCS | Performed by: NURSE PRACTITIONER

## 2019-12-17 PROCEDURE — G8427 DOCREV CUR MEDS BY ELIG CLIN: HCPCS | Performed by: NURSE PRACTITIONER

## 2019-12-17 PROCEDURE — G8484 FLU IMMUNIZE NO ADMIN: HCPCS | Performed by: NURSE PRACTITIONER

## 2019-12-17 PROCEDURE — 1036F TOBACCO NON-USER: CPT | Performed by: NURSE PRACTITIONER

## 2019-12-17 ASSESSMENT — ENCOUNTER SYMPTOMS
DIARRHEA: 0
ABDOMINAL PAIN: 0
BACK PAIN: 1
NAUSEA: 0
TROUBLE SWALLOWING: 0
EYE PAIN: 0
COUGH: 0
CONSTIPATION: 0
SHORTNESS OF BREATH: 0
EYE ITCHING: 0

## 2020-08-26 ENCOUNTER — TELEPHONE (OUTPATIENT)
Dept: RHEUMATOLOGY | Age: 25
End: 2020-08-26

## 2020-08-26 NOTE — TELEPHONE ENCOUNTER
Patient dropped off work form for work wanting limitations. Patient has not been seen since 12/2019 with no follow up appt. Patient called to schedule appt before forms will be filled out. Unable to leave VM (HIPAA).

## 2020-10-22 ENCOUNTER — OFFICE VISIT (OUTPATIENT)
Dept: RHEUMATOLOGY | Age: 25
End: 2020-10-22
Payer: COMMERCIAL

## 2020-10-22 VITALS
HEIGHT: 61 IN | WEIGHT: 130.4 LBS | HEART RATE: 76 BPM | BODY MASS INDEX: 24.62 KG/M2 | SYSTOLIC BLOOD PRESSURE: 108 MMHG | DIASTOLIC BLOOD PRESSURE: 48 MMHG | OXYGEN SATURATION: 97 %

## 2020-10-22 LAB
ALBUMIN SERPL-MCNC: 3.6 G/DL
ALP BLD-CCNC: 55 U/L
ALT SERPL-CCNC: 15 U/L
ANION GAP SERPL CALCULATED.3IONS-SCNC: 8 MMOL/L
AST SERPL-CCNC: 14 U/L
BASOPHILS ABSOLUTE: 0.02 /ΜL
BASOPHILS RELATIVE PERCENT: 0.4 %
BILIRUB SERPL-MCNC: 0.2 MG/DL (ref 0.1–1.4)
BUN BLDV-MCNC: 15 MG/DL
C-REACTIVE PROTEIN: 3.8
CALCIUM SERPL-MCNC: 8.6 MG/DL
CHLORIDE BLD-SCNC: 104 MMOL/L
CO2: 29 MMOL/L
CREAT SERPL-MCNC: 0.8 MG/DL
EOSINOPHILS ABSOLUTE: 0.12 /ΜL
EOSINOPHILS RELATIVE PERCENT: 2.4 %
GFR CALCULATED: 93
GLUCOSE BLD-MCNC: 87 MG/DL
HCT VFR BLD CALC: 40.5 % (ref 36–46)
HEMOGLOBIN: 13.5 G/DL (ref 12–16)
LYMPHOCYTES ABSOLUTE: 1.84 /ΜL
LYMPHOCYTES RELATIVE PERCENT: 36.4 %
MCH RBC QN AUTO: 29.2 PG
MCHC RBC AUTO-ENTMCNC: 33.3 G/DL
MCV RBC AUTO: 87.5 FL
MONOCYTES ABSOLUTE: 0.52 /ΜL
MONOCYTES RELATIVE PERCENT: 10.3 %
NEUTROPHILS ABSOLUTE: 2.54 /ΜL
NEUTROPHILS RELATIVE PERCENT: 50.3 %
PDW BLD-RTO: 38.3 %
PLATELET # BLD: 149 K/ΜL
PMV BLD AUTO: 10 FL
POTASSIUM SERPL-SCNC: 3.7 MMOL/L
RBC # BLD: 4.63 10^6/ΜL
SEDIMENTATION RATE, ERYTHROCYTE: 5
SODIUM BLD-SCNC: 137 MMOL/L
TOTAL PROTEIN: 7.1
WBC # BLD: 5.05 10^3/ML

## 2020-10-22 PROCEDURE — 1036F TOBACCO NON-USER: CPT | Performed by: INTERNAL MEDICINE

## 2020-10-22 PROCEDURE — G8484 FLU IMMUNIZE NO ADMIN: HCPCS | Performed by: INTERNAL MEDICINE

## 2020-10-22 PROCEDURE — G8420 CALC BMI NORM PARAMETERS: HCPCS | Performed by: INTERNAL MEDICINE

## 2020-10-22 PROCEDURE — 99214 OFFICE O/P EST MOD 30 MIN: CPT | Performed by: INTERNAL MEDICINE

## 2020-10-22 PROCEDURE — G8427 DOCREV CUR MEDS BY ELIG CLIN: HCPCS | Performed by: INTERNAL MEDICINE

## 2020-10-22 RX ORDER — PREDNISONE 10 MG/1
TABLET ORAL
Qty: 15 TABLET | Refills: 0 | Status: SHIPPED | OUTPATIENT
Start: 2020-10-22 | End: 2020-11-01

## 2020-10-22 ASSESSMENT — ENCOUNTER SYMPTOMS
NAUSEA: 0
EYE PAIN: 0
SHORTNESS OF BREATH: 0
EYE REDNESS: 0
VOMITING: 0
EYES NEGATIVE: 1
WHEEZING: 0
DIARRHEA: 0
COUGH: 0
CONSTIPATION: 0

## 2020-10-22 NOTE — PROGRESS NOTES
[facility] RHEUMATOLOGY FOLLOW UP NOTE       Date Of Service: 10/22/2020  Provider: Ade Christian DO    Name: Venessa Morris   MRN: [id number]    Chief Complaint(s)     Chief Complaint   Patient presents with    3 Month Follow-Up     de [place] tenosynovitis        History of Present Illness (HPI)     Venessa Morris  is a(n)25 y.o. female with a hx of rheumatoid arthrtis here for the f/u evaluation of rheumatoid arthritis. Off medication for 1 month. Rheumatoid arthritis bilat hands, wrists, + radiation pain/numbness intermittently occurring into the entire hands. Denies injuries. Numbness and sharp pain started since Friday. Pain on a scale 0-10: 7/10. Type of pain: intermittent, stiffness, aching Timing: morning. Aggravating factors: increased use, weather changes  Alleviating factors: compression gloves, naproxen (mild relief)    + AM stiffness lasting ~ 2-4 hour,  +  gelling    Current therapy:  Naproxen 500 mg BID PRN    Previous medications:  Prednisone (no relief)  Steroid wrist injection (9/2016 increased pain)  Physical therapy left wrist, lower back (no relief)  SSZ 1000 mg BID (Jan 2018)      REVIEW OF SYSTEMS: (ROS)    Review of Systems   Constitutional: Positive for fatigue. Negative for diaphoresis and fever. HENT: Negative for congestion, hearing loss and nosebleeds. Eyes: Negative. Negative for pain and redness. Respiratory: Negative for cough, shortness of breath and wheezing. Cardiovascular: Negative. Negative for chest pain. Gastrointestinal: Negative for constipation, diarrhea, nausea and vomiting. Genitourinary: Negative for difficulty urinating, frequency and hematuria. Musculoskeletal: Positive for arthralgias. Negative for myalgias. Skin: Negative for rash. Neurological: Negative for dizziness, weakness and headaches. Hematological: Does not bruise/bleed easily. Psychiatric/Behavioral: Negative for sleep disturbance.  The patient is not Cardiovascular:      Rate and Rhythm: Normal rate and regular rhythm. Pulmonary:      Effort: Pulmonary effort is normal.      Breath sounds: Normal breath sounds. Abdominal:      Palpations: Abdomen is soft. Tenderness: There is no abdominal tenderness. Skin:     General: Skin is warm and dry. Findings: No rash. Neurological:      Mental Status: She is alert and oriented to person, place, and time. Deep Tendon Reflexes: Reflexes are normal and symmetric. Psychiatric:         Thought Content: Thought content normal.       Musculoskeletal:     Normal gait. Strength 5/5 in biceps, triceps, hips, knees,      Upper extremities:   Shoulders: Non-tender, No swelling , No Deformities and intact ROM  Elbows:      Non-tender, No swelling , No Deformities and intact ROM  Wrists        Non-tender, No swelling , No Deformities and intact ROM  Hands:      Tenderness: Left 3 PIP  Swelling - right 3rd PIP. Bilateral thumbs, + Finklestein testing         Lower extremities:  Hips:       Non-tender, No swelling , No Deformities and intact ROM  Knees :   Non-tender, No swelling , No Deformities and intact ROM  Ankles:   Non-tender, No swelling , No Deformities and intact ROM      Spine:     C-spine: intact ROM, Non-tender, no swelling.    Low back tenderness        RAPID 3:   10/22/2020 --- RAPID 3: 0 + 4 + 0 = 4     Remission: <3  Low Disease Activity: <6  Moderate Disease Activity: >=6 and <=12  High Disease Activity: >12     LABS    CBC  Lab Results   Component Value Date    WBC 8.41 08/05/2019    RBC 4.15 08/05/2019    HGB 12.5 08/05/2019    HCT 37.6 08/05/2019    MCV 90.6 08/05/2019    MCH 30.1 08/05/2019    MCHC 33.2 08/05/2019    RDW 43.8 08/05/2019     08/05/2019       CMP  Lab Results   Component Value Date    CALCIUM 8.7 08/05/2019    LABALBU 2.6 08/05/2019     08/05/2019    K 3.1 08/05/2019    CO2 26 08/05/2019     08/05/2019    BUN 5 08/05/2019    CREATININE 0.6 08/05/2019 ALKPHOS 73 08/05/2019    ALT 19 08/05/2019    AST 17 08/05/2019       HgBA1c: No components found for: HGBA1C    No results found for: VITD25      No results found for: ANASCRN  No results found for: SSA  No results found for: SSB  No results found for: ANTI-SMITH  No results found for: DSDNAAB   No results found for: ANTIRNP  No results found for: C3, C4  No results found for: CCPAB  Lab Results   Component Value Date    RF 14.0 01/05/2018       No components found for: CANCASCRN, APANCASCRN  Lab Results   Component Value Date    SEDRATE 14 08/05/2019     Lab Results   Component Value Date    CRP 2.5 08/05/2019       RADIOLOGY:         ASSESSMENT/PLAN    Assessment   Plan     Previous therapy:   prednisone (no relief),   steroid wrist injection (9/2016:increased pain),   physical therapy left wrist, lower back (no relief)    SSZ 1000mg bid (Jan 2018)   Azathioprine 75 bid - continued disease activity. [id number]       Rheumatoid arthritis  - active curent flaring. +RF, + joint tenderness and swelling @ PIP joints, > 6 weeks duration, normal ESR & CRP  - MGM and PGM: SLE   - prednisone taper to pharmacy    - START - Methotrexate 10 mg po weekly   + folic acid -- if labs stable. GI intolerance, alopecia, fatigue, low blood counts, increased risk of infection. RARE: nodulosis, increased risk of lymphoma and non melanoma skin cancer, pulmonary scaring/inflammation. Contraindications: pericardial or pleural effusion, CKD stage 4-5, liver issues, ILD. Obtain baseline CXR. Reversal: high dose leucovorin     Right forearm pain - ? Pronator teres syndrome --- worse with supination of the forearm. Negative tinel, phalel at the wrist, Negative tinel at the elbow. Intact DTR - biceps, brachioradialis. ? Benign hypermobility syndrome   - patient with 3 of 9 Beighton score     De Quervain's tenosynovitis: b/l felt to be related to #1 --- resolved.        Plantar fasciitis-- bilateral--- resolved      Medication monitoring   - CBC, CMP, ESR, CRP q 4 weeks w/ methotrexate start     Return in about 2 months (around 12/22/2020). Electronically signed by Araceli Zapata,  on 10/22/2020 at 1:51 PM    New Prescriptions    No medications on file       Thank you for allowing me to participate in the care of this patient. Please call if there are any questions.

## 2020-10-26 RX ORDER — FOLIC ACID 1 MG/1
1 TABLET ORAL DAILY
Qty: 90 TABLET | Refills: 1 | Status: SHIPPED | OUTPATIENT
Start: 2020-10-26 | End: 2021-05-03 | Stop reason: ALTCHOICE

## 2020-11-18 LAB
ALBUMIN SERPL-MCNC: 3.9 G/DL
ALP BLD-CCNC: 56 U/L
ALT SERPL-CCNC: 23 U/L
ANION GAP SERPL CALCULATED.3IONS-SCNC: 11 MMOL/L
AST SERPL-CCNC: 16 U/L
BASOPHILS ABSOLUTE: 0.02 /ΜL
BASOPHILS RELATIVE PERCENT: 0.3 %
BILIRUB SERPL-MCNC: 0.3 MG/DL (ref 0.1–1.4)
BUN BLDV-MCNC: 14 MG/DL
C-REACTIVE PROTEIN: 1.3
CALCIUM SERPL-MCNC: 9.1 MG/DL
CHLORIDE BLD-SCNC: 103 MMOL/L
CO2: 29 MMOL/L
CREAT SERPL-MCNC: 0.8 MG/DL
EOSINOPHILS ABSOLUTE: 0.21 /ΜL
EOSINOPHILS RELATIVE PERCENT: 3.6 %
GFR CALCULATED: 93
GLUCOSE BLD-MCNC: 81 MG/DL
HCT VFR BLD CALC: 42.7 % (ref 36–46)
HEMOGLOBIN: 14.1 G/DL (ref 12–16)
LYMPHOCYTES ABSOLUTE: 1.93 /ΜL
LYMPHOCYTES RELATIVE PERCENT: 32.9 %
MCH RBC QN AUTO: 28.8 PG
MCHC RBC AUTO-ENTMCNC: 33 G/DL
MCV RBC AUTO: 87.1 FL
MONOCYTES ABSOLUTE: 0.5 /ΜL
MONOCYTES RELATIVE PERCENT: 8.5 %
NEUTROPHILS ABSOLUTE: 3.2 /ΜL
NEUTROPHILS RELATIVE PERCENT: 54.5 %
PDW BLD-RTO: 39 %
PLATELET # BLD: 178 K/ΜL
PMV BLD AUTO: 9.4 FL
POTASSIUM SERPL-SCNC: 3.9 MMOL/L
RBC # BLD: 4.9 10^6/ΜL
SEDIMENTATION RATE, ERYTHROCYTE: 5
SODIUM BLD-SCNC: 139 MMOL/L
TOTAL PROTEIN: 7.5
WBC # BLD: 5.87 10^3/ML

## 2020-11-23 NOTE — TELEPHONE ENCOUNTER
Yajaira Tovar called requesting a refill on the following medications:  Requested Prescriptions     Pending Prescriptions Disp Refills    methotrexate (RHEUMATREX) 2.5 MG chemo tablet 16 tablet 0     Sig: Take 4 tablets by mouth once a week     Pharmacy verified:  [email or web address] in [place], [place]    Date of last visit: 10/22/2020  Date of next visit (if applicable): 90/07/8877      **patient stated that she has completed her labs

## 2020-11-23 NOTE — TELEPHONE ENCOUNTER
DOLV: 10/22/20  DONV: 12/31/20  LAST LAB DRAW: 11/18/20  LAST TB TEST: na    Lab Results   Component Value Date     11/18/2020    K 3.9 11/18/2020     11/18/2020    CO2 29 11/18/2020    BUN 14 11/18/2020    CREATININE 0.8 11/18/2020    GLUCOSE 81 11/18/2020    CALCIUM 9.1 11/18/2020    LABALBU 3.9 11/18/2020    BILITOT 0.3 11/18/2020    ALKPHOS 56 11/18/2020    AST 16 11/18/2020    ALT 23 11/18/2020    LABGLOM 93 11/18/2020       Recent Labs     11/18/20   WBC 5.87   HGB 14.1   HCT 42.7   MCV 87.1          Lab Results   Component Value Date    SEDRATE 5 11/18/2020       Lab Results   Component Value Date    CRP 1.3 11/18/2020

## 2020-12-15 LAB
ALBUMIN SERPL-MCNC: 4 G/DL
ALP BLD-CCNC: 55 U/L
ALT SERPL-CCNC: 24 U/L
ANION GAP SERPL CALCULATED.3IONS-SCNC: 1.1 MMOL/L
AST SERPL-CCNC: 18 U/L
BASOPHILS ABSOLUTE: 0.02 /ΜL
BASOPHILS RELATIVE PERCENT: 0.4 %
BILIRUB SERPL-MCNC: 0.4 MG/DL (ref 0.1–1.4)
BUN BLDV-MCNC: 15 MG/DL
C-REACTIVE PROTEIN: 0.5
CALCIUM SERPL-MCNC: 8.7 MG/DL
CHLORIDE BLD-SCNC: 102 MMOL/L
CO2: 29 MMOL/L
CREAT SERPL-MCNC: 0.8 MG/DL
EOSINOPHILS ABSOLUTE: 0.14 /ΜL
EOSINOPHILS RELATIVE PERCENT: 2.6 %
GFR CALCULATED: 93
GLUCOSE BLD-MCNC: 78 MG/DL
HCT VFR BLD CALC: 40.9 % (ref 36–46)
HEMOGLOBIN: 13.7 G/DL (ref 12–16)
LYMPHOCYTES ABSOLUTE: 2.17 /ΜL
LYMPHOCYTES RELATIVE PERCENT: 40.8 %
MCH RBC QN AUTO: 30 PG
MCHC RBC AUTO-ENTMCNC: 33.5 G/DL
MCV RBC AUTO: 89.5 FL
MONOCYTES ABSOLUTE: 0.47 /ΜL
MONOCYTES RELATIVE PERCENT: 8.8 %
NEUTROPHILS ABSOLUTE: 2.5 /ΜL
NEUTROPHILS RELATIVE PERCENT: 47 %
PDW BLD-RTO: 42.2 %
PLATELET # BLD: 175 K/ΜL
PMV BLD AUTO: 10 FL
POTASSIUM SERPL-SCNC: 3.6 MMOL/L
RBC # BLD: 4.57 10^6/ΜL
SEDIMENTATION RATE, ERYTHROCYTE: 3
SODIUM BLD-SCNC: 139 MMOL/L
TOTAL PROTEIN: 7.6
WBC # BLD: 5.32 10^3/ML

## 2020-12-31 ENCOUNTER — OFFICE VISIT (OUTPATIENT)
Dept: RHEUMATOLOGY | Age: 25
End: 2020-12-31
Payer: COMMERCIAL

## 2020-12-31 VITALS
HEART RATE: 80 BPM | SYSTOLIC BLOOD PRESSURE: 108 MMHG | HEIGHT: 61 IN | DIASTOLIC BLOOD PRESSURE: 60 MMHG | WEIGHT: 131.4 LBS | TEMPERATURE: 97 F | BODY MASS INDEX: 24.81 KG/M2 | OXYGEN SATURATION: 98 %

## 2020-12-31 PROCEDURE — G8484 FLU IMMUNIZE NO ADMIN: HCPCS | Performed by: NURSE PRACTITIONER

## 2020-12-31 PROCEDURE — 99214 OFFICE O/P EST MOD 30 MIN: CPT | Performed by: NURSE PRACTITIONER

## 2020-12-31 PROCEDURE — 1036F TOBACCO NON-USER: CPT | Performed by: NURSE PRACTITIONER

## 2020-12-31 PROCEDURE — G8420 CALC BMI NORM PARAMETERS: HCPCS | Performed by: NURSE PRACTITIONER

## 2020-12-31 PROCEDURE — G8427 DOCREV CUR MEDS BY ELIG CLIN: HCPCS | Performed by: NURSE PRACTITIONER

## 2020-12-31 SDOH — HEALTH STABILITY: MENTAL HEALTH: HOW OFTEN DO YOU HAVE A DRINK CONTAINING ALCOHOL?: MONTHLY OR LESS

## 2020-12-31 SDOH — HEALTH STABILITY: MENTAL HEALTH: HOW MANY STANDARD DRINKS CONTAINING ALCOHOL DO YOU HAVE ON A TYPICAL DAY?: NOT ASKED

## 2020-12-31 NOTE — PROGRESS NOTES
[facility] RHEUMATOLOGY FOLLOW UP NOTE       Date Of Service: 12/31/2020  Provider: BLANCA Monzon - CNP    Name: Rodrigo Obrien   MRN: [id number]    Chief Complaint(s)     Chief Complaint   Patient presents with    Follow-up     2 month f/u  RA        History of Present Illness (HPI)     Rodrigo Obrien  is a(n)25 y.o. female with a hx of rheumatoid arthrtis here for the f/u evaluation of rheumatoid arthritis. Interval hx:    - just increased methotrexate dose 2 weeks ago- no difference overall    pain affecting the bilateral fingers, wrists  Pain on a scale 0-10: 7/10  Type of pain: intermittent, stiffness, aching  Timing: morning  Aggravating factors: increased use, weather changes  Alleviating factors: compression gloves, naproxen (mild relief)    Associated symptoms:  + swelling/  Redness/ warmth, + AM stiffness lasting ~ 3-4 hour,  +  gelling        REVIEW OF SYSTEMS: (ROS)    Review of Systems   Constitutional: Negative for fatigue, fever and unexpected weight change. HENT: Negative for congestion and trouble swallowing. Eyes: Negative for pain and itching. Respiratory: Negative for cough and shortness of breath. Cardiovascular: Negative for chest pain and leg swelling. Gastrointestinal: Negative for abdominal pain, constipation, diarrhea and nausea. Endocrine: Negative for cold intolerance, heat intolerance and polyuria. Genitourinary: Negative for difficulty urinating, frequency and urgency. Musculoskeletal: Positive for arthralgias and joint swelling. Negative for back pain. Skin: Negative for rash. Neurological: Positive for weakness and numbness. Negative for dizziness and headaches. Hematological: Does not bruise/bleed easily. Psychiatric/Behavioral: Positive for sleep disturbance. The patient is not nervous/anxious.         PAST MEDICAL HISTORY      Past Medical History:   Diagnosis Date    Arthritis, rheumatoid ([facility] [address]        PAST SURGICAL HISTORY Past Surgical History:   Procedure Laterality Date    WRIST ARTHROSCOPY Left 04/2017       FAMILY HISTORY      Family History   Problem Relation Age of Onset    No Known Problems Mother     High Blood Pressure Father     Arthritis Father     Lupus Maternal Grandmother     Lupus Paternal Grandmother     Arthritis Paternal Grandmother        SOCIAL HISTORY      Social History     Tobacco History     Smoking Status  Never Smoker    Smokeless Tobacco Use  Never Used          Alcohol History     Alcohol Use Status  Yes Comment  occas          Drug Use     Drug Use Status  No          Sexual Activity     Sexually Active  Yes                ALLERGIES   No Known Allergies    CURRENT MEDICATIONS      Current Outpatient Medications   Medication Sig Dispense Refill    methotrexate (RHEUMATREX) 2.5 MG chemo tablet Take 6 tablets by mouth once a week 24 tablet 0    folic acid (FOLVITE) 1 MG tablet Take 1 tablet by mouth daily 90 tablet 1    sertraline (ZOLOFT) 25 MG tablet Take 25 mg by mouth daily      olopatadine (PATADAY) 0.2 % SOLN ophthalmic solution 1 drop daily As needed      potassium chloride (KLOR-CON M) 20 MEQ extended release tablet Take 1 tablet by mouth 2 times daily for 5 days 10 tablet 0     No current facility-administered medications for this visit. PHYSICAL EXAMINATION / OBJECTIVE   Objective:  /60 (Site: Left Upper Arm, Position: Sitting, Cuff Size: Medium Adult)   Pulse 80   Temp 97 °F (36.1 °C)   Ht 5' 0.98\" (1.549 m)   Wt 131 lb 6.4 oz (59.6 kg)   SpO2 98%   BMI 24.84 kg/m²     Physical Exam  Vitals signs reviewed. Constitutional:       Appearance: She is well-developed. Neck:      Musculoskeletal: Normal range of motion and neck supple. Cardiovascular:      Rate and Rhythm: Normal rate and regular rhythm. Pulmonary:      Effort: Pulmonary effort is normal.      Breath sounds: Normal breath sounds. Abdominal:      Palpations: Abdomen is soft.       Tenderness: There is no abdominal tenderness. Skin:     General: Skin is warm and dry. Findings: No rash. Neurological:      Mental Status: She is alert and oriented to person, place, and time. Deep Tendon Reflexes: Reflexes are normal and symmetric. Psychiatric:         Thought Content: Thought content normal.       Musculoskeletal:     Normal gait. Strength 5/5 in biceps, triceps, hips, knees,      Upper extremities:   Shoulders: Non-tender, No swelling , No Deformities and intact ROM  Elbows:      Non-tender, No swelling , No Deformities and intact ROM  Wrists        Non-tender, No swelling , No Deformities and intact ROM  Hands:      Tenderness: Left 3 PIP  Bilateral thumbs, + Finklestein testing         Swelling: Right PIP 2,3,4 Left 3,4,5 PIP fullness      Lower extremities:  Hips:       Non-tender, No swelling , No Deformities and intact ROM  Knees :   Non-tender, No swelling , No Deformities and intact ROM  Ankles:   Non-tender, No swelling , No Deformities and intact ROM  Feet:      + MTP compression left, No swelling     Spine:     C-spine: intact ROM, Non-tender, no swelling.    Low back tenderness      RAPID 3:   12/31/2020 --- RAPID 3: 1 + 7 + 5 = 13    Remission: <3  Low Disease Activity: <6  Moderate Disease Activity: >=6 and <=12  High Disease Activity: >12     LABS    CBC  Lab Results   Component Value Date    WBC 5.32 12/15/2020    RBC 4.57 12/15/2020    HGB 13.7 12/15/2020    HCT 40.9 12/15/2020    MCV 89.5 12/15/2020    MCH 30.0 12/15/2020    MCHC 33.5 12/15/2020    RDW 42.2 12/15/2020     12/15/2020       CMP  Lab Results   Component Value Date    CALCIUM 8.7 12/15/2020    LABALBU 4.0 12/15/2020     12/15/2020    K 3.6 12/15/2020    CO2 29 12/15/2020     12/15/2020    BUN 15 12/15/2020    CREATININE 0.8 12/15/2020    ALKPHOS 55 12/15/2020    ALT 24 12/15/2020    AST 18 12/15/2020       HgBA1c: No components found for: HGBA1C    No results found for: VITD25      No results found for: ANASCRN  No results found for: SSA  No results found for: SSB  No results found for: ANTI-SMITH  No results found for: DSDNAAB   No results found for: ANTIRNP  No results found for: C3, C4  No results found for: CCPAB  Lab Results   Component Value Date    RF 14.0 01/05/2018       No components found for: CANCASCRN, APANCASCRN  Lab Results   Component Value Date    SEDRATE 3 12/15/2020     Lab Results   Component Value Date    CRP 0.5 12/15/2020       RADIOLOGY:         ASSESSMENT/PLAN    Assessment   Plan     Rheumatoid arthritis   +RF, + joint tenderness and swelling @ PIP joints, > 6 weeks duration, normal ESR & CRP  - MGM and PGM: SLE  - prior tx: SSZ (cont dz), azathioprine (cont dz)   - increase methotrexate to 20 mg PO weekly   - folic acid 1 mg daily    ? Benign hypermobility syndrome   - patient with 3 of 9 Beighton score     De Quervain's tenosynovitis: b/l felt to be related to #1   - + Finkelstein's test and painful resisted abduction of the thumb    Plantar fasciitis-- bilateral   - bilateral soles of feet   - previously discussed show inserts and stretches    Low back pain with sciatica   - Started with pregnancy   - Somewhat improved since delivery    Medication monitoring   - CBC, CMP, ESR, CRP q 4 weeks x3 w/ MTX increase      No follow-ups on file. Electronically signed by BLANCA Villalobos CNP on 12/31/2020 at 12:54 PM    New Prescriptions    No medications on file       Thank you for allowing me to participate in the care of this patient. Please call if there are any questions.

## 2021-01-26 LAB
ALBUMIN SERPL-MCNC: 3.9 G/DL
ALP BLD-CCNC: 56 U/L
ALT SERPL-CCNC: 28 U/L
ANION GAP SERPL CALCULATED.3IONS-SCNC: NORMAL MMOL/L
AST SERPL-CCNC: 19 U/L
BASOPHILS ABSOLUTE: 0.01 /ΜL
BASOPHILS RELATIVE PERCENT: 0.2 %
BILIRUB SERPL-MCNC: 0.5 MG/DL (ref 0.1–1.4)
BUN BLDV-MCNC: 12 MG/DL
C-REACTIVE PROTEIN: 0.5
CALCIUM SERPL-MCNC: 8.8 MG/DL
CHLORIDE BLD-SCNC: 104 MMOL/L
CO2: 32 MMOL/L
CREAT SERPL-MCNC: 0.8 MG/DL
EOSINOPHILS ABSOLUTE: 0.14 /ΜL
EOSINOPHILS RELATIVE PERCENT: 2.8 %
GFR CALCULATED: 93
GLUCOSE BLD-MCNC: 66 MG/DL
HCT VFR BLD CALC: 41.5 % (ref 36–46)
HEMOGLOBIN: 13.9 G/DL (ref 12–16)
LYMPHOCYTES ABSOLUTE: 2.16 /ΜL
LYMPHOCYTES RELATIVE PERCENT: 43.4 %
MCH RBC QN AUTO: 29.7 PG
MCHC RBC AUTO-ENTMCNC: 33.5 G/DL
MCV RBC AUTO: 88.7 FL
MONOCYTES ABSOLUTE: 0.38 /ΜL
MONOCYTES RELATIVE PERCENT: 7.6 %
NEUTROPHILS ABSOLUTE: 2.28 /ΜL
NEUTROPHILS RELATIVE PERCENT: 45.8 %
PDW BLD-RTO: 39.5 %
PLATELET # BLD: 202 K/ΜL
PMV BLD AUTO: 9.6 FL
POTASSIUM SERPL-SCNC: 3.3 MMOL/L
RBC # BLD: 4.68 10^6/ΜL
SEDIMENTATION RATE, ERYTHROCYTE: 5
SODIUM BLD-SCNC: 141 MMOL/L
TOTAL PROTEIN: 7.5
WBC # BLD: 4.98 10^3/ML

## 2021-02-18 NOTE — TELEPHONE ENCOUNTER
Patient states that she received a message on her [employer] that the labs were received by the office and her meds would be refilled but they have not been filled yet.   Please advise  [phone]

## 2021-02-18 NOTE — TELEPHONE ENCOUNTER
Moy Reyes called requesting a refill on the following medications:  Requested Prescriptions     Pending Prescriptions Disp Refills    methotrexate (RHEUMATREX) 2.5 MG chemo tablet [Pharmacy Med Name: METHOTREXATE 2.5 MG TABLET] 32 tablet 0     Sig: TAKE 8 TABLETS BY MOUTH WEEKLY; 4 IN THE MORNING AND 4 AT NIGHT     Pharmacy verified:  [email or web address] in [place], [place]    Date of last visit: 12/31/2020  Date of next visit (if applicable): 69/30/4917

## 2021-02-19 LAB
ALBUMIN SERPL-MCNC: 3.7 G/DL
ALP BLD-CCNC: 54 U/L
ALT SERPL-CCNC: 23 U/L
ANION GAP SERPL CALCULATED.3IONS-SCNC: 14 MMOL/L
AST SERPL-CCNC: 14 U/L
BASOPHILS ABSOLUTE: 0.02 /ΜL
BASOPHILS RELATIVE PERCENT: 0.4 %
BILIRUB SERPL-MCNC: 0.4 MG/DL (ref 0.1–1.4)
BUN BLDV-MCNC: 12 MG/DL
C-REACTIVE PROTEIN: 0.5
CALCIUM SERPL-MCNC: 8.5 MG/DL
CHLORIDE BLD-SCNC: 106 MMOL/L
CO2: 26 MMOL/L
CREAT SERPL-MCNC: 0.6 MG/DL
EOSINOPHILS ABSOLUTE: 0.17 /ΜL
EOSINOPHILS RELATIVE PERCENT: 3.1 %
GFR CALCULATED: 129
GLUCOSE BLD-MCNC: 86 MG/DL
HCT VFR BLD CALC: 39.8 % (ref 36–46)
HEMOGLOBIN: 13.6 G/DL (ref 12–16)
LYMPHOCYTES ABSOLUTE: 1.83 /ΜL
LYMPHOCYTES RELATIVE PERCENT: 33.3 %
MCH RBC QN AUTO: 30.4 PG
MCHC RBC AUTO-ENTMCNC: 34.2 G/DL
MCV RBC AUTO: 89 FL
MONOCYTES ABSOLUTE: 0.42 /ΜL
MONOCYTES RELATIVE PERCENT: 7.6 %
NEUTROPHILS ABSOLUTE: 3.05 /ΜL
NEUTROPHILS RELATIVE PERCENT: 55.4 %
PDW BLD-RTO: 40.2 %
PLATELET # BLD: 154 K/ΜL
PMV BLD AUTO: 9.8 FL
POTASSIUM SERPL-SCNC: 3.7 MMOL/L
RBC # BLD: 4.47 10^6/ΜL
SEDIMENTATION RATE, ERYTHROCYTE: 5
SODIUM BLD-SCNC: 142 MMOL/L
TOTAL PROTEIN: 7.2
WBC # BLD: 5.5 10^3/ML

## 2021-02-19 RX ORDER — METHOTREXATE 2.5 MG/1
TABLET ORAL
Qty: 32 TABLET | Refills: 0 | Status: SHIPPED | OUTPATIENT
Start: 2021-02-19 | End: 2021-02-22 | Stop reason: SDUPTHER

## 2021-02-19 NOTE — TELEPHONE ENCOUNTER
DOLV: 12/31/20  DONV: 03/03/21  LAST LAB DRAW: 01/26  LAST TB TEST: NA    Lab Results   Component Value Date     01/26/2021    K 3.3 01/26/2021     01/26/2021    CO2 32 01/26/2021    BUN 12 01/26/2021    CREATININE 0.8 01/26/2021    GLUCOSE 66 01/26/2021    CALCIUM 8.8 01/26/2021    LABALBU 3.9 01/26/2021    BILITOT 0.5 01/26/2021    ALKPHOS 56 01/26/2021    AST 19 01/26/2021    ALT 28 01/26/2021    LABGLOM 93 01/26/2021       Recent Labs     01/26/21   WBC 4.98   HGB 13.9   HCT 41.5   MCV 88.7          Lab Results   Component Value Date    SEDRATE 5 01/26/2021       Lab Results   Component Value Date    CRP 0.5 01/26/2021

## 2021-02-22 RX ORDER — METHOTREXATE 2.5 MG/1
TABLET ORAL
Qty: 32 TABLET | Refills: 0 | Status: SHIPPED | OUTPATIENT
Start: 2021-02-22 | End: 2021-05-03

## 2021-03-03 ENCOUNTER — OFFICE VISIT (OUTPATIENT)
Dept: RHEUMATOLOGY | Age: 26
End: 2021-03-03
Payer: COMMERCIAL

## 2021-03-03 VITALS
BODY MASS INDEX: 26.01 KG/M2 | TEMPERATURE: 98.1 F | DIASTOLIC BLOOD PRESSURE: 76 MMHG | HEART RATE: 75 BPM | HEIGHT: 61 IN | OXYGEN SATURATION: 99 % | SYSTOLIC BLOOD PRESSURE: 102 MMHG | WEIGHT: 137.8 LBS

## 2021-03-03 DIAGNOSIS — M72.2 PLANTAR FASCIITIS: ICD-10-CM

## 2021-03-03 DIAGNOSIS — M65.4 DE QUERVAIN'S TENOSYNOVITIS, BILATERAL: ICD-10-CM

## 2021-03-03 DIAGNOSIS — M05.79 RHEUMATOID ARTHRITIS INVOLVING MULTIPLE SITES WITH POSITIVE RHEUMATOID FACTOR (HCC): Primary | ICD-10-CM

## 2021-03-03 DIAGNOSIS — Z51.81 MEDICATION MONITORING ENCOUNTER: ICD-10-CM

## 2021-03-03 DIAGNOSIS — G89.29 CHRONIC MIDLINE LOW BACK PAIN WITH SCIATICA, SCIATICA LATERALITY UNSPECIFIED: ICD-10-CM

## 2021-03-03 DIAGNOSIS — M54.40 CHRONIC MIDLINE LOW BACK PAIN WITH SCIATICA, SCIATICA LATERALITY UNSPECIFIED: ICD-10-CM

## 2021-03-03 PROCEDURE — G8484 FLU IMMUNIZE NO ADMIN: HCPCS | Performed by: NURSE PRACTITIONER

## 2021-03-03 PROCEDURE — 99214 OFFICE O/P EST MOD 30 MIN: CPT | Performed by: NURSE PRACTITIONER

## 2021-03-03 PROCEDURE — G8419 CALC BMI OUT NRM PARAM NOF/U: HCPCS | Performed by: NURSE PRACTITIONER

## 2021-03-03 PROCEDURE — 1036F TOBACCO NON-USER: CPT | Performed by: NURSE PRACTITIONER

## 2021-03-03 PROCEDURE — G8427 DOCREV CUR MEDS BY ELIG CLIN: HCPCS | Performed by: NURSE PRACTITIONER

## 2021-03-03 RX ORDER — LEFLUNOMIDE 10 MG/1
10 TABLET ORAL DAILY
Qty: 30 TABLET | Refills: 0 | Status: SHIPPED | OUTPATIENT
Start: 2021-03-03 | End: 2021-04-06 | Stop reason: SDUPTHER

## 2021-03-03 RX ORDER — FLUTICASONE PROPIONATE 50 MCG
1 SPRAY, SUSPENSION (ML) NASAL DAILY
COMMUNITY

## 2021-03-03 RX ORDER — MONTELUKAST SODIUM 5 MG/1
5 TABLET, CHEWABLE ORAL NIGHTLY
COMMUNITY
End: 2021-05-03

## 2021-03-03 ASSESSMENT — ENCOUNTER SYMPTOMS
ABDOMINAL PAIN: 0
EYE PAIN: 0
NAUSEA: 0
COUGH: 0
BACK PAIN: 0
EYE ITCHING: 0
CONSTIPATION: 0
DIARRHEA: 0
TROUBLE SWALLOWING: 0
SHORTNESS OF BREATH: 0

## 2021-03-03 NOTE — PROGRESS NOTES
[facility] RHEUMATOLOGY FOLLOW UP NOTE       Date Of Service: 3/3/2021  Provider: BLANCA Schulz - CNP    Name: Rosemarie Ramírez   MRN: [id number]    Chief Complaint(s)     Chief Complaint   Patient presents with    Follow-up     2 months Rheumatoid Arthritis        History of Present Illness (HPI)     Rosemarie Ramírez  is a(n)25 y.o. female with a hx of rheumatoid arthrtis here for the f/u evaluation of rheumatoid arthritis. Interval hx:    - off methotrexate- no improvement with it    pain affecting the fingers, wrists, toes  Pain on a scale 0-10: 5/10  Type of pain: intermittent, stiffness, aching  Timing: morning  Aggravating factors: increased use, weather changes  Alleviating factors: compression gloves, naproxen (mild relief)    Associated symptoms:  + swelling/  Redness/ warmth (hands), + AM stiffness lasting ~ all day      REVIEW OF SYSTEMS: (ROS)    Review of Systems   Constitutional: Negative for fatigue, fever and unexpected weight change. HENT: Negative for congestion and trouble swallowing. Eyes: Negative for pain and itching. Respiratory: Negative for cough and shortness of breath. Cardiovascular: Negative for chest pain and leg swelling. Gastrointestinal: Negative for abdominal pain, constipation, diarrhea and nausea. Endocrine: Negative for cold intolerance, heat intolerance and polyuria. Genitourinary: Negative for difficulty urinating, frequency and urgency. Musculoskeletal: Positive for arthralgias and joint swelling. Negative for back pain. Skin: Negative for rash. Neurological: Positive for weakness and numbness. Negative for dizziness and headaches. Hematological: Does not bruise/bleed easily. Psychiatric/Behavioral: Positive for sleep disturbance. The patient is not nervous/anxious.         PAST MEDICAL HISTORY      Past Medical History:   Diagnosis Date    Arthritis, rheumatoid ([address]        PAST SURGICAL HISTORY      Past Surgical History: Procedure Laterality Date    WRIST ARTHROSCOPY Left 04/2017       FAMILY HISTORY      Family History   Problem Relation Age of Onset    No Known Problems Mother     High Blood Pressure Father     Arthritis Father     Lupus Maternal Grandmother     Lupus Paternal Grandmother     Arthritis Paternal Grandmother        SOCIAL HISTORY      Social History     Tobacco History     Smoking Status  Never Smoker    Smokeless Tobacco Use  Never Used          Alcohol History     Alcohol Use Status  Yes Comment  occas          Drug Use     Drug Use Status  No          Sexual Activity     Sexually Active  Yes                ALLERGIES   No Known Allergies    CURRENT MEDICATIONS      Current Outpatient Medications   Medication Sig Dispense Refill    methotrexate (RHEUMATREX) 2.5 MG chemo tablet TAKE 8 TABLETS BY MOUTH WEEKLY; 4 IN THE MORNING AND 4 AT NIGHT 32 tablet 0    folic acid (FOLVITE) 1 MG tablet Take 1 tablet by mouth daily 90 tablet 1    potassium chloride (KLOR-CON M) 20 MEQ extended release tablet Take 1 tablet by mouth 2 times daily for 5 days 10 tablet 0    sertraline (ZOLOFT) 25 MG tablet Take 25 mg by mouth daily      olopatadine (PATADAY) 0.2 % SOLN ophthalmic solution 1 drop daily As needed       No current facility-administered medications for this visit. PHYSICAL EXAMINATION / OBJECTIVE   Objective:  Ht 5' 0.98\" (1.549 m)   BMI 24.84 kg/m²     Physical Exam  Vitals signs reviewed. Constitutional:       Appearance: She is well-developed. Neck:      Musculoskeletal: Normal range of motion and neck supple. Cardiovascular:      Rate and Rhythm: Normal rate and regular rhythm. Pulmonary:      Effort: Pulmonary effort is normal.      Breath sounds: Normal breath sounds. Abdominal:      Palpations: Abdomen is soft. Tenderness: There is no abdominal tenderness. Skin:     General: Skin is warm and dry. Findings: No rash.    Neurological:      Mental Status: She is alert and SEDRATE 5 02/19/2021     Lab Results   Component Value Date    CRP 0.5 02/19/2021       RADIOLOGY:         ASSESSMENT/PLAN    Assessment   Plan     Rheumatoid arthritis   +RF, + joint tenderness and swelling @ PIP joints, > 6 weeks duration, normal ESR & CRP  - MGM and PGM: SLE  - prior tx: SSZ (cont dz), azathioprine (cont dz)   - stop methotrexate and folic acid due to no relief   - start arava 10 mg PO daily. Side effects: GI intolerance, diarrhea, alopecia, infection; weight loss, low blood counts, liver injury, ILD, oral sores, rash; RARE: neuropathy. Discussed birth control and teratogenic effects    ? Benign hypermobility syndrome   - patient with 3 of 9 Beighton score     De Quervain's tenosynovitis: b/l felt to be related to #1   - + Finkelstein's test and painful resisted abduction of the thumb    Plantar fasciitis-- bilateral   - bilateral soles of feet   - previously discussed show inserts and stretches    Medication monitoring   - CBC, CMP, ESR, CRP q 4 weeks x3 w/ arava start      No follow-ups on file. Electronically signed by BLANCA Reynaga CNP on 3/3/2021 at 2:53 PM    New Prescriptions    No medications on file       Thank you for allowing me to participate in the care of this patient. Please call if there are any questions.

## 2021-03-26 LAB
ALBUMIN SERPL-MCNC: 3.7 G/DL
ALP BLD-CCNC: 65 U/L
ALT SERPL-CCNC: 19 U/L
ANION GAP SERPL CALCULATED.3IONS-SCNC: 0.9 MMOL/L
AST SERPL-CCNC: 13 U/L
BASOPHILS ABSOLUTE: 0.1 /ΜL
BASOPHILS RELATIVE PERCENT: 0.4 %
BILIRUB SERPL-MCNC: 0.5 MG/DL (ref 0.1–1.4)
BUN BLDV-MCNC: 12 MG/DL
C-REACTIVE PROTEIN: 2.1
CALCIUM SERPL-MCNC: 9 MG/DL
CHLORIDE BLD-SCNC: 104 MMOL/L
CO2: 31 MMOL/L
CREAT SERPL-MCNC: 0.7 MG/DL
EOSINOPHILS ABSOLUTE: 0.13 /ΜL
EOSINOPHILS RELATIVE PERCENT: 2.4 %
GFR CALCULATED: 108
GLUCOSE BLD-MCNC: 88 MG/DL
HCT VFR BLD CALC: 44 % (ref 36–46)
HEMOGLOBIN: 14.3 G/DL (ref 12–16)
LYMPHOCYTES ABSOLUTE: 1.58 /ΜL
LYMPHOCYTES RELATIVE PERCENT: 29.4 %
MCH RBC QN AUTO: 29.2 PG
MCHC RBC AUTO-ENTMCNC: 32.4 G/DL
MCV RBC AUTO: 90.2 FL
MONOCYTES ABSOLUTE: 0.4 /ΜL
MONOCYTES RELATIVE PERCENT: 7.4 %
NEUTROPHILS ABSOLUTE: 3.24 /ΜL
NEUTROPHILS RELATIVE PERCENT: 60.2 %
PDW BLD-RTO: 37.2 %
PLATELET # BLD: 214 K/ΜL
PMV BLD AUTO: 9.6 FL
POTASSIUM SERPL-SCNC: 3.7 MMOL/L
RBC # BLD: 4.89 10^6/ΜL
SEDIMENTATION RATE, ERYTHROCYTE: 8
SODIUM BLD-SCNC: 142 MMOL/L
TOTAL PROTEIN: 7.7
WBC # BLD: 5.38 10^3/ML

## 2021-04-06 RX ORDER — LEFLUNOMIDE 10 MG/1
10 TABLET ORAL DAILY
Qty: 30 TABLET | Refills: 0 | Status: SHIPPED | OUTPATIENT
Start: 2021-04-06 | End: 2021-09-13

## 2021-04-06 NOTE — RESULT ENCOUNTER NOTE
The labs are stable compared with the prior evaluation.  Please continue current medications and have your labs repeated every 4 weeks for the next 2 months

## 2021-05-03 ENCOUNTER — OFFICE VISIT (OUTPATIENT)
Dept: RHEUMATOLOGY | Age: 26
End: 2021-05-03
Payer: COMMERCIAL

## 2021-05-03 VITALS
WEIGHT: 137 LBS | OXYGEN SATURATION: 97 % | SYSTOLIC BLOOD PRESSURE: 110 MMHG | DIASTOLIC BLOOD PRESSURE: 60 MMHG | HEART RATE: 92 BPM | HEIGHT: 61 IN | BODY MASS INDEX: 25.86 KG/M2

## 2021-05-03 DIAGNOSIS — M65.4 DE QUERVAIN'S TENOSYNOVITIS, BILATERAL: ICD-10-CM

## 2021-05-03 DIAGNOSIS — G89.29 CHRONIC MIDLINE LOW BACK PAIN WITH RIGHT-SIDED SCIATICA: ICD-10-CM

## 2021-05-03 DIAGNOSIS — M54.41 CHRONIC MIDLINE LOW BACK PAIN WITH RIGHT-SIDED SCIATICA: ICD-10-CM

## 2021-05-03 DIAGNOSIS — M05.79 RHEUMATOID ARTHRITIS INVOLVING MULTIPLE SITES WITH POSITIVE RHEUMATOID FACTOR (HCC): Primary | ICD-10-CM

## 2021-05-03 DIAGNOSIS — M72.2 PLANTAR FASCIITIS: ICD-10-CM

## 2021-05-03 PROCEDURE — 1036F TOBACCO NON-USER: CPT | Performed by: INTERNAL MEDICINE

## 2021-05-03 PROCEDURE — G8427 DOCREV CUR MEDS BY ELIG CLIN: HCPCS | Performed by: INTERNAL MEDICINE

## 2021-05-03 PROCEDURE — 99214 OFFICE O/P EST MOD 30 MIN: CPT | Performed by: INTERNAL MEDICINE

## 2021-05-03 PROCEDURE — G8419 CALC BMI OUT NRM PARAM NOF/U: HCPCS | Performed by: INTERNAL MEDICINE

## 2021-05-03 RX ORDER — MONTELUKAST SODIUM 5 MG/1
5 TABLET, CHEWABLE ORAL NIGHTLY
Qty: 30 TABLET | Refills: 1
Start: 2021-05-03 | End: 2022-10-17 | Stop reason: ALTCHOICE

## 2021-05-03 ASSESSMENT — ENCOUNTER SYMPTOMS
DIARRHEA: 0
COUGH: 0
NAUSEA: 0
EYES NEGATIVE: 1
EYE REDNESS: 0
EYE PAIN: 0
CONSTIPATION: 0
WHEEZING: 0
VOMITING: 0
SHORTNESS OF BREATH: 0

## 2021-05-03 NOTE — PROGRESS NOTES
[facility] [place] RHEUMATOLOGY FOLLOW UP NOTE       Date Of Service: 5/3/2021  Provider: Jl Sweeney DO  PCP: Adam Webb MD   Name: Debi Boyer   MRN: [id number]        History of Present Illness (HPI)     Chief Complaint   Patient presents with    Follow-up      Debi Boyer  is a(n)25 y.o. female with a hx of rheumatoid arthrtis here for the f/u evaluation of rheumatoid arthritis.       Rheumatoid arthritis ---  arava 10mg daily started march 2021. -- reported recurrent ear infxns since start - evaluation on ENT, s/p 2 rounds of antibiotics. scheduled for deviated septum. Currently with pain in the bilaterla hands and wrists, lower back. Pain up to 8/10 over the past week. Timing: morning  Aggravating factors: increased use, weather changes   Alleviating factors: naproxen (mild relief)  Limited ROm of the fingers with decreased dexterity. numbness Right hand started a couple weeks ago     low back pain with occasional radicular symptoms down the right leg since her pregnancy with her child who is currently 21 months old. REVIEW OF SYSTEMS: (ROS)    Review of Systems   Constitutional: Negative for diaphoresis, fatigue and fever. HENT: Negative for congestion, hearing loss and nosebleeds. Eyes: Negative. Negative for pain and redness. Respiratory: Negative for cough, shortness of breath and wheezing. Cardiovascular: Negative. Negative for chest pain. Gastrointestinal: Negative for constipation, diarrhea, nausea and vomiting. Genitourinary: Negative for difficulty urinating, frequency and hematuria. Musculoskeletal: Positive for arthralgias, joint swelling and myalgias. Skin: Negative for rash. Neurological: Positive for weakness. Negative for dizziness and headaches. Hematological: Does not bruise/bleed easily. Psychiatric/Behavioral: Negative for sleep disturbance. The patient is not nervous/anxious.           PAST MEDICAL HISTORY     has a past medical history of Arthritis, rheumatoid ([facility] [address] SOCIAL HISTORY     reports that she has never smoked. She has never used smokeless tobacco. She reports current alcohol use. She reports that she does not use drugs. ALLERGIES   No Known Allergies    CURRENT MEDICATIONS      Current Outpatient Medications:     leflunomide (ARAVA) 10 MG tablet, Take 1 tablet by mouth daily, Disp: 30 tablet, Rfl: 0    montelukast (SINGULAIR) 5 MG chewable tablet, Take 5 mg by mouth nightly, Disp: , Rfl:     fluticasone (FLONASE) 50 MCG/ACT nasal spray, 1 spray by Each Nostril route daily, Disp: , Rfl:     methotrexate (RHEUMATREX) 2.5 MG chemo tablet, TAKE 8 TABLETS BY MOUTH WEEKLY; 4 IN THE MORNING AND 4 AT NIGHT, Disp: 32 tablet, Rfl: 0    folic acid (FOLVITE) 1 MG tablet, Take 1 tablet by mouth daily, Disp: 90 tablet, Rfl: 1    sertraline (ZOLOFT) 25 MG tablet, Take 25 mg by mouth daily, Disp: , Rfl:     olopatadine (PATADAY) 0.2 % SOLN ophthalmic solution, 1 drop daily As needed, Disp: , Rfl:     PHYSICAL EXAMINATION / OBJECTIVE     Objective:  /60 (Site: Left Upper Arm, Position: Sitting, Cuff Size: Medium Adult)   Pulse 92   Ht 5' 0.98\" (1.549 m)   Wt 137 lb (62.1 kg)   SpO2 97%   BMI 25.90 kg/m²     Physical Exam      General Appearance: General appearance:  AAO x 3 ,  well-developed and well nourished  Head: NCAT  Eyes: No abnormalities. ,  Sclera non-icteric,   Ears / Nose:  normal  appearance  ears and nose. No active drainage from nose. Mouth:  MMM, ears w/o deformities  Neck: No jugular venous distention, appears symmetric, good ROM  Lymph: no cervical adenopathy   Pulmonary/Chest: CTA bilateral ,  symmetric chest expansion. Cardiovascular: Normal S1 and S2, NO murmur, rub, gallop  : Deferred   Abd/GI: Deferred   Neurologic: Speech normal, no facial droop,  Skin: NO rash on exposed skin. Musculoskeletal:   ROM ,  Strength intact bilat.       Upper extremities:    SHOULDERs: ELBOWS , WRISTS  - Non-tender, non-tinel   HANDS/FINGERS : pips tender bilat. Bony enlargement bilat pips. Finkelstein test bilat     Lower extremities:  HIPS , KNEES , ANKLES --- Non-tender, No swelling    FEET : mtp compression bilat, fullness. Spine:   C-spine, T-spine & L-spine:  Non-tender, No swelling     Exam KEY:   Tender : T    Swelling: S,   Deformities: D,   Non-tender : NT  ,  No swelling: NS       RAPID 3  5/3/2021 --- RAPID 3:  No  0.3  + 8 + 5.5 = 13.8       Remission: <3  Low Disease Activity: <6  Moderate Disease Activity: >=6 and <=12  High Disease Activity: >12     LABS      Lab Results   Component Value Date    WBC 5.38 03/26/2021    HGB 14.3 03/26/2021    MCV 90.2 03/26/2021    MCHC 32.4 03/26/2021    RDW 37.2 03/26/2021     03/26/2021    NEUTROABS 3.24 03/26/2021    LYMPHSABS 1.58 03/26/2021    EOSABS 0.13 03/26/2021    BASOSABS 0.1 03/26/2021         Chemistry        Component Value Date/Time     03/26/2021    K 3.7 03/26/2021     03/26/2021    CO2 31 03/26/2021    BUN 12 03/26/2021    CREATININE 0.7 03/26/2021        Component Value Date/Time    CALCIUM 9.0 03/26/2021    ALKPHOS 65 03/26/2021    AST 13 03/26/2021    ALT 19 03/26/2021    BILITOT 0.5 03/26/2021            Lab Results   Component Value Date    SEDRATE 8 03/26/2021    SEDRATE 5 02/19/2021    SEDRATE 5 01/26/2021    CRP 2.1 03/26/2021    CRP 0.5 02/19/2021    CRP 0.5 01/26/2021       No results found for: VITD25    No results found for: ANASCRN  No results found for: SSA  No results found for: SSB  No results found for: ANTI-SMITH  No results found for: DSDNAAB   No results found for: ANTIRNP  No results found for: C3, C4  No results found for: CCPAB  Lab Results   Component Value Date    RF 14.0 01/05/2018           RADIOLOGY / PROCEDURES:     ASSESSMENT/PLAN:     1. [facility] involving multiple sites [facility]    2. Plantar fasciitis    3.  De Quervain's tenosynovitis, bilateral      Rheumatoid arthritis   +RF14, + joint tenderness and swelling @ PIP joints, > 6 weeks duration, normal ESR & CRP. MGM and PGM: SLE  - prior tx: SSZ (cont dz), azathioprine (cont dz), methotrexate (no relief) , arava - recurrent ear infection.    - d/c arava    -Plan on starting enbrel 50mg q wk. - 1-2 after surgery  - TNF INHIBITORS can cause increased risk of TB reactivation, oppurtunistic infections, lupus like illness, rash, hypersensitivity reaction, infusion reactions, demyelinating disease and possible risk of malignancies and lung diseases and were explained to the patient    -Need baseline tuberculosis testing prior to initiation of anti-TNF. Additionally we will repeat x-rays of the hands to evaluate for progression of arthritic changes. ? Benign hypermobility syndrome- patient with 3 of 9 Beighton score      De Quervain's tenosynovitis: b/l felt to be related to #1               - + Finkelstein's test and painful resisted abduction of the thumb     Plantar fasciitis-- bilateral               - bilateral soles of feet               - previously discussed show inserts and stretches    Low back pain: With radicular pain. Intermittent localized without weakness, paresthesias, incontinence. Physical examination revealed a left on right sacral torsion. Addressed osteopathic manipulation with muscle energy status post manipulation patient reported significant improvement of symptoms. Chaperoned by NIKI Prescott      Medication monitoring               - CBC, CMP, ESR, CRP q 4 months.           No follow-ups on file. New Prescriptions    No medications on file       5/3/2021    Electronically signed by Taya Reza DO on 5/3/21 at 3:55 PM EDT  Please contact the office if you have any questions or change of symptoms.

## 2021-05-04 LAB
ALBUMIN SERPL-MCNC: 3.6 G/DL
ALP BLD-CCNC: 58 U/L
ALT SERPL-CCNC: 21 U/L
ANION GAP SERPL CALCULATED.3IONS-SCNC: 14 MMOL/L
AST SERPL-CCNC: 12 U/L
BASOPHILS ABSOLUTE: 0.02 /ΜL
BASOPHILS RELATIVE PERCENT: 0.4 %
BILIRUB SERPL-MCNC: 0.6 MG/DL (ref 0.1–1.4)
BUN BLDV-MCNC: 12 MG/DL
C-REACTIVE PROTEIN: 0.5
CALCIUM SERPL-MCNC: 8.7 MG/DL
CHLORIDE BLD-SCNC: 105 MMOL/L
CO2: 26 MMOL/L
CREAT SERPL-MCNC: 0.7 MG/DL
EOSINOPHILS ABSOLUTE: 0.15 /ΜL
EOSINOPHILS RELATIVE PERCENT: 3.1 %
GFR CALCULATED: 108
GLUCOSE BLD-MCNC: 91 MG/DL
HCT VFR BLD CALC: 41.8 % (ref 36–46)
HEMOGLOBIN: 13.9 G/DL (ref 12–16)
LYMPHOCYTES ABSOLUTE: 1.85 /ΜL
LYMPHOCYTES RELATIVE PERCENT: 38.5 %
MCH RBC QN AUTO: 29.5 PG
MCHC RBC AUTO-ENTMCNC: 33.3 G/DL
MCV RBC AUTO: 88.7 FL
MONOCYTES ABSOLUTE: 0.36 /ΜL
MONOCYTES RELATIVE PERCENT: 7.5 %
NEUTROPHILS ABSOLUTE: 2.43 /ΜL
NEUTROPHILS RELATIVE PERCENT: 50.5 %
PDW BLD-RTO: 37.1 %
PLATELET # BLD: 157 K/ΜL
PMV BLD AUTO: 10.3 FL
POTASSIUM SERPL-SCNC: 3.7 MMOL/L
RBC # BLD: 4.71 10^6/ΜL
RHEUMATOID FACTOR: 10
SEDIMENTATION RATE, ERYTHROCYTE: 2
SODIUM BLD-SCNC: 141 MMOL/L
TOTAL PROTEIN: 7.2
WBC # BLD: 4.81 10^3/ML

## 2021-05-07 DIAGNOSIS — M05.79 RHEUMATOID ARTHRITIS INVOLVING MULTIPLE SITES WITH POSITIVE RHEUMATOID FACTOR (HCC): ICD-10-CM

## 2021-05-14 ENCOUNTER — TELEPHONE (OUTPATIENT)
Dept: RHEUMATOLOGY | Age: 26
End: 2021-05-14

## 2021-05-14 DIAGNOSIS — M05.79 RHEUMATOID ARTHRITIS INVOLVING MULTIPLE SITES WITH POSITIVE RHEUMATOID FACTOR (HCC): Primary | ICD-10-CM

## 2021-05-14 RX ORDER — ETANERCEPT 50 MG/ML
50 SOLUTION SUBCUTANEOUS WEEKLY
Qty: 4 SYRINGE | Refills: 5 | Status: SHIPPED | OUTPATIENT
Start: 2021-05-14 | End: 2021-06-03 | Stop reason: SDUPTHER

## 2021-05-14 NOTE — TELEPHONE ENCOUNTER
Patient calling in regarding starting enbrel. She said she was told to wait until after she had a surgery, and that is all taken care of and she is cleared from the surgeon. She said she had all the blood work and/or testing she was told to do. Can the enbrel be filled to [facility] in [facility]? She asked that she get a msg through [employer] regarding this. Please advise.

## 2021-05-14 NOTE — PROGRESS NOTES
Diagnosis Orders   1.  Rheumatoid arthritis involving multiple sites with positive rheumatoid factor (HCC)  Etanercept (ENBREL SURECLICK) 50 MG/ML SOAJ

## 2021-05-19 ENCOUNTER — TELEPHONE (OUTPATIENT)
Dept: RHEUMATOLOGY | Age: 26
End: 2021-05-19

## 2021-05-20 NOTE — TELEPHONE ENCOUNTER
Pt called her insurance company today. They told her the reason it keeps being denied is because [facility] isn't authorized to dispense this medication and it has to be sent to [facility]. It will be approved through [facility] only  Pt doesn't have the name of the person at the insurance company.

## 2021-06-02 DIAGNOSIS — M05.79 RHEUMATOID ARTHRITIS INVOLVING MULTIPLE SITES WITH POSITIVE RHEUMATOID FACTOR (HCC): ICD-10-CM

## 2021-06-02 NOTE — TELEPHONE ENCOUNTER
** pharmacy called to request a verbal order for this medication for the patient. Denzel stated that they have the prior authorization and a request from the patient to fill, they are just needing the script for this for the patient. He advised that a fax or electronic order can take up to 24 hours to process and the verbal they could process immediately.  He requested a call to # [phone]

## 2021-06-02 NOTE — TELEPHONE ENCOUNTER
Kurt Perez called requesting a refill on the following medications:  Requested Prescriptions     Pending Prescriptions Disp Refills    Etanercept (ENBREL SURECLICK) 50 MG/ML SOAJ 4 Syringe 5     Sig: Inject 50 mg into the skin once a week     Pharmacy verified:  [facility] in [place], [place]  # [phone]    Date of last visit:   Date of next visit (if applicable): 4/2/7741

## 2021-06-03 RX ORDER — ETANERCEPT 50 MG/ML
50 SOLUTION SUBCUTANEOUS WEEKLY
Qty: 4 SYRINGE | Refills: 5 | Status: SHIPPED | OUTPATIENT
Start: 2021-06-03 | End: 2021-09-13 | Stop reason: SDUPTHER

## 2021-06-14 ENCOUNTER — TELEPHONE (OUTPATIENT)
Dept: RHEUMATOLOGY | Age: 26
End: 2021-06-14

## 2021-06-14 NOTE — TELEPHONE ENCOUNTER
Received a call from [address] [place] [address] at [phone] stating she was sent a damaged enbrel and wants a replacement. Phoned and LM informing ok to give the replacement per Dr Noe Dowell verbal order.

## 2021-07-08 ENCOUNTER — OFFICE VISIT (OUTPATIENT)
Dept: RHEUMATOLOGY | Age: 26
End: 2021-07-08
Payer: COMMERCIAL

## 2021-07-08 VITALS
HEART RATE: 85 BPM | BODY MASS INDEX: 26.88 KG/M2 | SYSTOLIC BLOOD PRESSURE: 100 MMHG | WEIGHT: 142.4 LBS | DIASTOLIC BLOOD PRESSURE: 58 MMHG | HEIGHT: 61 IN | OXYGEN SATURATION: 98 %

## 2021-07-08 DIAGNOSIS — M65.4 DE QUERVAIN'S TENOSYNOVITIS, BILATERAL: ICD-10-CM

## 2021-07-08 DIAGNOSIS — M72.2 PLANTAR FASCIITIS: ICD-10-CM

## 2021-07-08 DIAGNOSIS — M05.79 RHEUMATOID ARTHRITIS INVOLVING MULTIPLE SITES WITH POSITIVE RHEUMATOID FACTOR (HCC): Primary | ICD-10-CM

## 2021-07-08 DIAGNOSIS — Z51.81 MEDICATION MONITORING ENCOUNTER: ICD-10-CM

## 2021-07-08 DIAGNOSIS — G89.29 CHRONIC MIDLINE LOW BACK PAIN WITH RIGHT-SIDED SCIATICA: ICD-10-CM

## 2021-07-08 DIAGNOSIS — M54.41 CHRONIC MIDLINE LOW BACK PAIN WITH RIGHT-SIDED SCIATICA: ICD-10-CM

## 2021-07-08 PROCEDURE — G8419 CALC BMI OUT NRM PARAM NOF/U: HCPCS | Performed by: NURSE PRACTITIONER

## 2021-07-08 PROCEDURE — G8427 DOCREV CUR MEDS BY ELIG CLIN: HCPCS | Performed by: NURSE PRACTITIONER

## 2021-07-08 PROCEDURE — 1036F TOBACCO NON-USER: CPT | Performed by: NURSE PRACTITIONER

## 2021-07-08 PROCEDURE — 99214 OFFICE O/P EST MOD 30 MIN: CPT | Performed by: NURSE PRACTITIONER

## 2021-07-08 ASSESSMENT — ENCOUNTER SYMPTOMS
COUGH: 0
EYE PAIN: 0
ABDOMINAL PAIN: 0
CONSTIPATION: 0
EYE ITCHING: 0
TROUBLE SWALLOWING: 0
NAUSEA: 0
SHORTNESS OF BREATH: 0
BACK PAIN: 0
DIARRHEA: 0

## 2021-07-08 NOTE — PROGRESS NOTES
[facility] RHEUMATOLOGY FOLLOW UP NOTE       Date Of Service: 7/8/2021  Provider: BLANCA Moreno CNP    Name: Antonieta Guadalupe   MRN: [id number]    Chief Complaint(s)     Chief Complaint   Patient presents with    Follow-up     2 month         History of Present Illness (HPI)     Antonieta Guadalupe  is a(n)25 y.o. female with a hx of rheumatoid arthritis here for the f/u evaluation of rheumatoid arthritis. Interval hx:    - started enbrel 5 weeks ago    pain affecting the fingers, wrists  Pain on a scale 0-10: 6/10  Type of pain: intermittent, stiffness, aching  Timing: morning  Aggravating factors: increased use, weather changes  Alleviating factors: compression gloves, naproxen (mild relief)    Associated symptoms:  + swelling/  Redness/ warmth (hands), + AM stiffness lasting ~ 3-4 hours      REVIEW OF SYSTEMS: (ROS)    Review of Systems   Constitutional: Negative for fatigue, fever and unexpected weight change. HENT: Negative for congestion and trouble swallowing. Eyes: Negative for pain and itching. Respiratory: Negative for cough and shortness of breath. Cardiovascular: Negative for chest pain and leg swelling. Gastrointestinal: Negative for abdominal pain, constipation, diarrhea and nausea. Endocrine: Negative for cold intolerance, heat intolerance and polyuria. Genitourinary: Negative for difficulty urinating, frequency and urgency. Musculoskeletal: Positive for arthralgias and joint swelling. Negative for back pain. Skin: Negative for rash. Neurological: Positive for weakness and numbness. Negative for dizziness and headaches. Hematological: Does not bruise/bleed easily. Psychiatric/Behavioral: Positive for sleep disturbance. The patient is not nervous/anxious.         PAST MEDICAL HISTORY      Past Medical History:   Diagnosis Date    Arthritis, rheumatoid ([address]        PAST SURGICAL HISTORY      Past Surgical History:   Procedure Laterality Date    WRIST Musculoskeletal:      Cervical back: Normal range of motion and neck supple. Skin:     General: Skin is warm and dry. Findings: No rash. Neurological:      Mental Status: She is alert and oriented to person, place, and time. Deep Tendon Reflexes: Reflexes are normal and symmetric. Psychiatric:         Thought Content: Thought content normal.       Upper extremities:   Shoulders: Non-tender, No swelling ,  Elbows:      Non-tender, No swelling   Wrists        Non-tender, No swelling   Hands:      Tender bilat PIPs.  + finkelstein testing bilat    Lower extremities:  Hips:       Non-tender, No swelling   Knees :   Non-tender, No swelling   Ankles:   Non-tender, No swelling   Feet:      + MTP compression bilat, + bogginess          RAPID 3:   7/8/2021 --- RAPID 3: 0 + 6 + 5.5 = 11.5    Remission: <3  Low Disease Activity: <6  Moderate Disease Activity: >=6 and <=12  High Disease Activity: >12     LABS    CBC  Lab Results   Component Value Date    WBC 4.81 05/04/2021    RBC 4.71 05/04/2021    HGB 13.9 05/04/2021    HCT 41.8 05/04/2021    MCV 88.7 05/04/2021    MCH 29.5 05/04/2021    MCHC 33.3 05/04/2021    RDW 37.1 05/04/2021     05/04/2021       CMP  Lab Results   Component Value Date    CALCIUM 8.7 05/04/2021    LABALBU 3.6 05/04/2021     05/04/2021    K 3.7 05/04/2021    CO2 26 05/04/2021     05/04/2021    BUN 12 05/04/2021    CREATININE 0.7 05/04/2021    ALKPHOS 58 05/04/2021    ALT 21 05/04/2021    AST 12 05/04/2021       HgBA1c: No components found for: HGBA1C    No results found for: VITD25      No results found for: ANASCRN  No results found for: SSA  No results found for: SSB  No results found for: ANTI-SMITH  No results found for: DSDNAAB   No results found for: ANTIRNP  No results found for: C3, C4  No results found for: CCPAB  Lab Results   Component Value Date    RF 10.0 05/04/2021       No components found for: CANCASCRN, APANCASCRN  Lab Results   Component Value Date SEDRATE 2 05/04/2021     Lab Results   Component Value Date    CRP 0.5 05/04/2021       RADIOLOGY:         ASSESSMENT/PLAN    Assessment   Plan     Rheumatoid arthritis   +RF, + joint tenderness and swelling @ PIP joints, > 6 weeks duration, normal ESR & CRP  - MGM and PGM: SLE  - prior tx: SSZ (cont dz), azathioprine (cont dz), arava (recurrent ear infection), methtorexate (no relief)   - enbrel 50 mg subcu weekly    ? Benign hypermobility syndrome   - patient with 3 of 9 Beighton score     De Quervain's tenosynovitis: b/l felt to be related to #1  - + Finkelstein's test and painful resisted abduction of the thumb    Plantar fasciitis-- bilateral   - bilateral soles of feet   - previously discussed show inserts and stretches    Medication monitoring   - CBC, CMP, ESR, CRP q 4-6 months    No follow-ups on file. Electronically signed by BLANCA Esquivel CNP on 7/8/2021 at 3:06 PM    New Prescriptions    No medications on file       Thank you for allowing me to participate in the care of this patient. Please call if there are any questions.

## 2021-08-30 ENCOUNTER — TELEPHONE (OUTPATIENT)
Dept: RHEUMATOLOGY | Age: 26
End: 2021-08-30

## 2021-08-30 NOTE — TELEPHONE ENCOUNTER
[place] [address] [facility] [place] Alicja Ashton [phone] for an approval to fill her Enbrel, Rainer says that she has exceeded the maximum dosage so cant do automatic refills. Needs done today, otherwise wont be able to take her dose on time.

## 2021-09-13 ENCOUNTER — OFFICE VISIT (OUTPATIENT)
Dept: RHEUMATOLOGY | Age: 26
End: 2021-09-13
Payer: COMMERCIAL

## 2021-09-13 VITALS
HEART RATE: 76 BPM | OXYGEN SATURATION: 99 % | HEIGHT: 61 IN | SYSTOLIC BLOOD PRESSURE: 102 MMHG | BODY MASS INDEX: 26.62 KG/M2 | DIASTOLIC BLOOD PRESSURE: 60 MMHG | WEIGHT: 141 LBS

## 2021-09-13 DIAGNOSIS — M72.2 PLANTAR FASCIITIS: ICD-10-CM

## 2021-09-13 DIAGNOSIS — M05.79 RHEUMATOID ARTHRITIS INVOLVING MULTIPLE SITES WITH POSITIVE RHEUMATOID FACTOR (HCC): Primary | ICD-10-CM

## 2021-09-13 DIAGNOSIS — R20.2 PARESTHESIA OF BOTH HANDS: ICD-10-CM

## 2021-09-13 DIAGNOSIS — M65.4 DE QUERVAIN'S TENOSYNOVITIS, BILATERAL: ICD-10-CM

## 2021-09-13 DIAGNOSIS — Z51.81 MEDICATION MONITORING ENCOUNTER: ICD-10-CM

## 2021-09-13 PROBLEM — J34.2 DEVIATED NASAL SEPTUM: Status: ACTIVE | Noted: 2021-09-13

## 2021-09-13 PROBLEM — E66.3 OVERWEIGHT WITH BODY MASS INDEX (BMI) 25.0-29.9: Status: ACTIVE | Noted: 2021-09-13

## 2021-09-13 PROCEDURE — 99214 OFFICE O/P EST MOD 30 MIN: CPT | Performed by: INTERNAL MEDICINE

## 2021-09-13 PROCEDURE — G8419 CALC BMI OUT NRM PARAM NOF/U: HCPCS | Performed by: INTERNAL MEDICINE

## 2021-09-13 PROCEDURE — G8427 DOCREV CUR MEDS BY ELIG CLIN: HCPCS | Performed by: INTERNAL MEDICINE

## 2021-09-13 PROCEDURE — 1036F TOBACCO NON-USER: CPT | Performed by: INTERNAL MEDICINE

## 2021-09-13 RX ORDER — FOLIC ACID 1 MG/1
1 TABLET ORAL DAILY
Qty: 90 TABLET | Refills: 1 | Status: SHIPPED | OUTPATIENT
Start: 2021-09-13 | End: 2022-06-29 | Stop reason: SDUPTHER

## 2021-09-13 RX ORDER — ETANERCEPT 50 MG/ML
50 SOLUTION SUBCUTANEOUS WEEKLY
Qty: 4 ML | Refills: 5 | Status: SHIPPED | OUTPATIENT
Start: 2021-09-13 | End: 2022-02-22 | Stop reason: ALTCHOICE

## 2021-09-13 ASSESSMENT — ENCOUNTER SYMPTOMS
CONSTIPATION: 0
BACK PAIN: 0
EYE PAIN: 0
COUGH: 0
TROUBLE SWALLOWING: 0
DIARRHEA: 0
SHORTNESS OF BREATH: 0
ABDOMINAL PAIN: 0
NAUSEA: 0
EYE ITCHING: 0

## 2021-09-13 NOTE — PROGRESS NOTES
[facility] RHEUMATOLOGY FOLLOW UP NOTE       Date Of Service: 9/13/2021  Provider: Reyes Marcelo DO    Name: Ermelinda Johnston   MRN: [id number]    Chief Complaint(s)     Chief Complaint   Patient presents with    Follow-up     2 month f/u RA         History of Present Illness (HPI)     Ermelinda Johnston  is a(n)26 y.o. female with a hx of rheumatoid arthritis here for the f/u evaluation of rheumatoid arthritis. Enbrel weekly - off the medication for 3 weeks w/ worsening pain. But noted only mild relief while on the medication. pain affecting the fingers, wrists, and lower back. Pain 7/10. intermittent, stiffness, aching Timing: morning Aggravating factors: increased use, weather changes. Alleviating factors: compression gloves, naproxen (mild relief)  Cont swelling of hands. + AM stiffness lasting ~ 6-7 hours. Paresthesia of the hands right > left , increased hand weakness,  + insomnia secondary to pain. REVIEW OF SYSTEMS: (ROS)    Review of Systems   Constitutional: Positive for fever. Negative for fatigue and unexpected weight change. HENT: Negative for congestion and trouble swallowing. Eyes: Negative for pain and itching. Respiratory: Negative for cough and shortness of breath. Cardiovascular: Negative for chest pain and leg swelling. Gastrointestinal: Negative for abdominal pain, constipation, diarrhea and nausea. Endocrine: Negative for cold intolerance, heat intolerance and polyuria. Genitourinary: Negative for difficulty urinating, frequency and urgency. Musculoskeletal: Positive for arthralgias and joint swelling. Negative for back pain. Skin: Negative for rash. Neurological: Positive for weakness and numbness (Rt arm a few weeks ago. ). Negative for dizziness and headaches. Hematological: Does not bruise/bleed easily. Psychiatric/Behavioral: Positive for sleep disturbance. The patient is not nervous/anxious.         PAST MEDICAL HISTORY      Past Medical History:   Diagnosis Date    Arthritis, rheumatoid ([address]        PAST SURGICAL HISTORY      Past Surgical History:   Procedure Laterality Date    WRIST ARTHROSCOPY Left 04/2017       FAMILY HISTORY      Family History   Problem Relation Age of Onset    No Known Problems Mother     High Blood Pressure Father     Arthritis Father     Lupus Maternal Grandmother     Lupus Paternal Grandmother     Arthritis Paternal Grandmother        SOCIAL HISTORY      Social History     Tobacco History     Smoking Status  Never Smoker    Smokeless Tobacco Use  Never Used          Alcohol History     Alcohol Use Status  Yes Comment  occas          Drug Use     Drug Use Status  No          Sexual Activity     Sexually Active  Yes                ALLERGIES   No Known Allergies    CURRENT MEDICATIONS      Current Outpatient Medications   Medication Sig Dispense Refill    cetirizine (ZYRTEC) 10 MG tablet Take 1 tablet by mouth daily (Patient not taking: Reported on 9/13/2021) 30 tablet 0    Etanercept (ENBREL SURECLICK) 50 MG/ML SOAJ Inject 50 mg into the skin once a week (Patient not taking: Reported on 9/13/2021) 4 Syringe 5    montelukast (SINGULAIR) 5 MG chewable tablet Take 1 tablet by mouth nightly (Patient not taking: Reported on 9/13/2021) 30 tablet 1    leflunomide (ARAVA) 10 MG tablet Take 1 tablet by mouth daily (Patient not taking: Reported on 9/13/2021) 30 tablet 0    fluticasone (FLONASE) 50 MCG/ACT nasal spray 1 spray by Each Nostril route daily (Patient not taking: Reported on 9/13/2021)      sertraline (ZOLOFT) 25 MG tablet Take 25 mg by mouth daily (Patient not taking: Reported on 9/13/2021)      olopatadine (PATADAY) 0.2 % SOLN ophthalmic solution 1 drop daily As needed (Patient not taking: Reported on 9/13/2021)       No current facility-administered medications for this visit.        PHYSICAL EXAMINATION / OBJECTIVE   Objective:  /60 (Site: Left Upper Arm, Position: Sitting, Cuff Size: Medium Adult) Pulse 76   Ht 5' 0.98\" (1.549 m)   Wt 141 lb (64 kg)   SpO2 99%   BMI 26.66 kg/m²     Physical Exam  Vitals reviewed. Constitutional:       Appearance: She is well-developed. Cardiovascular:      Rate and Rhythm: Normal rate and regular rhythm. Pulmonary:      Effort: Pulmonary effort is normal.      Breath sounds: Normal breath sounds. Abdominal:      Palpations: Abdomen is soft. Tenderness: There is no abdominal tenderness. Musculoskeletal:      Cervical back: Normal range of motion and neck supple. Skin:     General: Skin is warm and dry. Findings: No rash. Neurological:      Mental Status: She is alert and oriented to person, place, and time. Deep Tendon Reflexes: Reflexes are normal and symmetric. Psychiatric:         Thought Content: Thought content normal.       Upper extremities:   Shoulders: Non-tender, No swelling ,  Elbows:      Non-tender, No swelling   Wrists        Tinel right > left. Hands:      PIPs - tender right 4th.   + PIP enlargment     Lower extremities:  Hips:       Non-tender, No swelling   Knees :   Non-tender, No swelling   Ankles:   Non-tender, No swelling   Feet:      Non-tender, No swelling           RAPID 3:   9/13/2021 --- RAPID 3: 0 + 7 + 5 = 12    Remission: <3  Low Disease Activity: <6  Moderate Disease Activity: >=6 and <=12  High Disease Activity: >12     LABS    CBC  Lab Results   Component Value Date    WBC 4.81 05/04/2021    RBC 4.71 05/04/2021    HGB 13.9 05/04/2021    HCT 41.8 05/04/2021    MCV 88.7 05/04/2021    MCH 29.5 05/04/2021    MCHC 33.3 05/04/2021    RDW 37.1 05/04/2021     05/04/2021       CMP  Lab Results   Component Value Date    CALCIUM 8.7 05/04/2021    LABALBU 3.6 05/04/2021     05/04/2021    K 3.7 05/04/2021    CO2 26 05/04/2021     05/04/2021    BUN 12 05/04/2021    CREATININE 0.7 05/04/2021    ALKPHOS 58 05/04/2021    ALT 21 05/04/2021    AST 12 05/04/2021       HgBA1c: No components found for: HGBA1C      Lab Results   Component Value Date    RF 10.0 05/04/2021       No components found for: CANCASCRN, APANCASCRN  Lab Results   Component Value Date    SEDRATE 2 05/04/2021     Lab Results   Component Value Date    CRP 0.5 05/04/2021       RADIOLOGY:         ASSESSMENT/PLAN    Assessment   Plan     Rheumatoid arthritis  +RF, + joint tenderness and swelling @ PIP joints, > 6 weeks duration, normal ESR & CRP - MGM and PGM: SLE- prior tx: SSZ (cont dz), azathioprine (cont dz), arava (recurrent ear infection), methtorexate (no relief)   - enbrel 50 mg subcu weekly (June 2021)    - start Methotrexate 10mg po weekly and folic acid 1mg daily     Paresthesia of hands - + tinel right > left with suspected carpal tunnel symtoms  - emg/ncv ordered. - discussed trial of wrist splinting. ? Benign hypermobility syndrome  --  patient with 3 of 9 Beighton score     De Quervain's tenosynovitis: b/l felt to be related to #1 --- resolved     Plantar fasciitis-- bilateral - resolved       Medication monitoring   - CBC, CMP, ESR, CRP q 4-6 months    No follow-ups on file. Electronically signed by Quentin Anders DO on 9/13/2021 at 10:14 AM    New Prescriptions    No medications on file       Thank you for allowing me to participate in the care of this patient. Please call if there are any questions.

## 2021-09-16 ENCOUNTER — PROCEDURE VISIT (OUTPATIENT)
Dept: NEUROLOGY | Age: 26
End: 2021-09-16
Payer: COMMERCIAL

## 2021-09-16 DIAGNOSIS — R20.0 BILATERAL HAND NUMBNESS: ICD-10-CM

## 2021-09-16 DIAGNOSIS — M79.642 BILATERAL HAND PAIN: Primary | ICD-10-CM

## 2021-09-16 DIAGNOSIS — M79.641 BILATERAL HAND PAIN: Primary | ICD-10-CM

## 2021-09-16 PROCEDURE — 95886 MUSC TEST DONE W/N TEST COMP: CPT | Performed by: PSYCHIATRY & NEUROLOGY

## 2021-09-16 PROCEDURE — 95911 NRV CNDJ TEST 9-10 STUDIES: CPT | Performed by: PSYCHIATRY & NEUROLOGY

## 2021-10-13 LAB
ALBUMIN SERPL-MCNC: 4 G/DL
ALP BLD-CCNC: 58 U/L
ALT SERPL-CCNC: 22 U/L
ANION GAP SERPL CALCULATED.3IONS-SCNC: 12 MMOL/L
AST SERPL-CCNC: 19 U/L
BASOPHILS ABSOLUTE: 0.04 /ΜL
BASOPHILS RELATIVE PERCENT: 0.5 %
BILIRUB SERPL-MCNC: 0.2 MG/DL (ref 0.1–1.4)
BUN BLDV-MCNC: 15 MG/DL
C-REACTIVE PROTEIN: 0.6
CALCIUM SERPL-MCNC: 8.6 MG/DL
CHLORIDE BLD-SCNC: 105 MMOL/L
CO2: 27 MMOL/L
CREAT SERPL-MCNC: 0.6 MG/DL
EOSINOPHILS ABSOLUTE: 0.5 /ΜL
EOSINOPHILS RELATIVE PERCENT: 6 %
GFR CALCULATED: 128
GLUCOSE BLD-MCNC: 87 MG/DL
HCT VFR BLD CALC: 40.3 % (ref 36–46)
HEMOGLOBIN: 13.3 G/DL (ref 12–16)
LYMPHOCYTES ABSOLUTE: 2.96 /ΜL
LYMPHOCYTES RELATIVE PERCENT: 35.8 %
MCH RBC QN AUTO: 29.2 PG
MCHC RBC AUTO-ENTMCNC: 33 G/DL
MCV RBC AUTO: 88.4 FL
MONOCYTES ABSOLUTE: 0.66 /ΜL
MONOCYTES RELATIVE PERCENT: 8 %
NEUTROPHILS ABSOLUTE: 4.09 /ΜL
NEUTROPHILS RELATIVE PERCENT: 49.5 %
PDW BLD-RTO: 39.6 %
PLATELET # BLD: 180 K/ΜL
PMV BLD AUTO: 10.1 FL
POTASSIUM SERPL-SCNC: 3.7 MMOL/L
RBC # BLD: 4.56 10^6/ΜL
SEDIMENTATION RATE, ERYTHROCYTE: 5
SODIUM BLD-SCNC: 140 MMOL/L
TOTAL PROTEIN: 7.5
WBC # BLD: 8.27 10^3/ML

## 2021-10-14 DIAGNOSIS — M05.79 RHEUMATOID ARTHRITIS INVOLVING MULTIPLE SITES WITH POSITIVE RHEUMATOID FACTOR (HCC): ICD-10-CM

## 2021-11-09 LAB
ALBUMIN SERPL-MCNC: 4.2 G/DL
ALP BLD-CCNC: 57 U/L
ALT SERPL-CCNC: 23 U/L
ANION GAP SERPL CALCULATED.3IONS-SCNC: 15 MMOL/L
AST SERPL-CCNC: 20 U/L
BASOPHILS ABSOLUTE: 0.03 /ΜL
BASOPHILS RELATIVE PERCENT: 0.4 %
BILIRUB SERPL-MCNC: 0.3 MG/DL (ref 0.1–1.4)
BUN BLDV-MCNC: 15 MG/DL
C-REACTIVE PROTEIN: 0.8
C-REACTIVE PROTEIN: NORMAL
CALCIUM SERPL-MCNC: 8.8 MG/DL
CHLORIDE BLD-SCNC: 106 MMOL/L
CO2: 26 MMOL/L
CREAT SERPL-MCNC: 0.7 MG/DL
EOSINOPHILS ABSOLUTE: 0.34 /ΜL
EOSINOPHILS RELATIVE PERCENT: 4.8 %
GFR CALCULATED: 108
GLUCOSE BLD-MCNC: 84 MG/DL
HCT VFR BLD CALC: 40.7 % (ref 36–46)
HEMOGLOBIN: 13.6 G/DL (ref 12–16)
LYMPHOCYTES ABSOLUTE: 2.6 /ΜL
LYMPHOCYTES RELATIVE PERCENT: 36.9 %
MCH RBC QN AUTO: 29.6 PG
MCHC RBC AUTO-ENTMCNC: 33.4 G/DL
MCV RBC AUTO: 88.5 FL
MONOCYTES ABSOLUTE: 0.54 /ΜL
MONOCYTES RELATIVE PERCENT: 7.7 %
NEUTROPHILS ABSOLUTE: 3.51 /ΜL
NEUTROPHILS RELATIVE PERCENT: 49.9 %
PDW BLD-RTO: 42.5 %
PLATELET # BLD: 175 K/ΜL
PMV BLD AUTO: 10 FL
POTASSIUM SERPL-SCNC: 3.8 MMOL/L
RBC # BLD: 4.6 10^6/ΜL
SEDIMENTATION RATE, ERYTHROCYTE: 5
SODIUM BLD-SCNC: 143 MMOL/L
TOTAL PROTEIN: 7.1
WBC # BLD: 7.04 10^3/ML

## 2021-11-15 ENCOUNTER — OFFICE VISIT (OUTPATIENT)
Dept: RHEUMATOLOGY | Age: 26
End: 2021-11-15
Payer: COMMERCIAL

## 2021-11-15 VITALS
SYSTOLIC BLOOD PRESSURE: 112 MMHG | BODY MASS INDEX: 27.75 KG/M2 | WEIGHT: 147 LBS | HEIGHT: 61 IN | OXYGEN SATURATION: 98 % | DIASTOLIC BLOOD PRESSURE: 68 MMHG | HEART RATE: 86 BPM

## 2021-11-15 DIAGNOSIS — M72.2 PLANTAR FASCIITIS: Primary | ICD-10-CM

## 2021-11-15 DIAGNOSIS — Z51.81 MEDICATION MONITORING ENCOUNTER: ICD-10-CM

## 2021-11-15 DIAGNOSIS — R20.2 PARESTHESIA OF BOTH HANDS: ICD-10-CM

## 2021-11-15 DIAGNOSIS — M05.79 RHEUMATOID ARTHRITIS INVOLVING MULTIPLE SITES WITH POSITIVE RHEUMATOID FACTOR (HCC): ICD-10-CM

## 2021-11-15 DIAGNOSIS — M65.4 DE QUERVAIN'S TENOSYNOVITIS, BILATERAL: ICD-10-CM

## 2021-11-15 PROCEDURE — G8419 CALC BMI OUT NRM PARAM NOF/U: HCPCS | Performed by: INTERNAL MEDICINE

## 2021-11-15 PROCEDURE — 99214 OFFICE O/P EST MOD 30 MIN: CPT | Performed by: INTERNAL MEDICINE

## 2021-11-15 PROCEDURE — G8484 FLU IMMUNIZE NO ADMIN: HCPCS | Performed by: INTERNAL MEDICINE

## 2021-11-15 PROCEDURE — 1036F TOBACCO NON-USER: CPT | Performed by: INTERNAL MEDICINE

## 2021-11-15 PROCEDURE — G8427 DOCREV CUR MEDS BY ELIG CLIN: HCPCS | Performed by: INTERNAL MEDICINE

## 2021-11-15 ASSESSMENT — ENCOUNTER SYMPTOMS
ABDOMINAL PAIN: 0
SHORTNESS OF BREATH: 0
EYE ITCHING: 0
EYE PAIN: 0
TROUBLE SWALLOWING: 0
DIARRHEA: 0
COUGH: 0
NAUSEA: 0
CONSTIPATION: 0

## 2021-11-15 NOTE — PROGRESS NOTES
[facility] RHEUMATOLOGY FOLLOW UP NOTE       Date Of Service: 11/15/2021  Provider: Girish Romero DO    Name: Norlin Gottron   MRN: [id number]    Chief Complaint(s)     Chief Complaint   Patient presents with    Follow-up     2 month f/u RA         History of Present Illness (HPI)     Norlin Gottron  is a(n)26 y.o. female with a hx of rheumatoid arthritis here for the f/u evaluation of rheumatoid arthritis. Flare of arthritis for the past 2 wks w/ swelling bilat hands, wrists, with difficulty making a fist.   pain up to 7.5/10 over the past week, throbbing type pain. Wearing wrist splints, and taking current medications consistently for the past 2 months. Insomnia b/c pain,   Mild swelling of hands continues   Aggravating factors: increased use, weather changes. Alleviating factors: compression gloves, naproxen (mild relief)   Am stiffness lasting 3-5 hours bilat hands, thumbs. Some difficulyt making a composit fist.   Occasional triggering right hand. Paresthesia of the hands right > left , hand weakness,  + insomnia secondary to pain. Sx's worse in increased use of hands. REVIEW OF SYSTEMS: (ROS)    Review of Systems   Constitutional: Positive for fever. Negative for fatigue and unexpected weight change. HENT: Negative for congestion and trouble swallowing. Eyes: Negative for pain and itching. Respiratory: Negative for cough and shortness of breath. Cardiovascular: Negative for chest pain and leg swelling. Gastrointestinal: Negative for abdominal pain, constipation, diarrhea and nausea. Endocrine: Negative for cold intolerance, heat intolerance and polyuria. Genitourinary: Negative for difficulty urinating, frequency and urgency. Skin: Negative for rash. Neurological: Positive for weakness and numbness (Rt arm a few weeks ago. ). Negative for dizziness and headaches. Hematological: Does not bruise/bleed easily.    Psychiatric/Behavioral: Positive for sleep disturbance. The patient is not nervous/anxious. PAST MEDICAL HISTORY      Past Medical History:   Diagnosis Date    Arthritis, rheumatoid ([address]        PAST SURGICAL HISTORY      Past Surgical History:   Procedure Laterality Date    WRIST ARTHROSCOPY Left 04/2017       FAMILY HISTORY      Family History   Problem Relation Age of Onset    No Known Problems Mother     High Blood Pressure Father     Arthritis Father     Lupus Maternal Grandmother     Lupus Paternal Grandmother     Arthritis Paternal Grandmother        SOCIAL HISTORY      Social History     Tobacco History     Smoking Status  Never Smoker    Smokeless Tobacco Use  Never Used          Alcohol History     Alcohol Use Status  Yes Comment  occas          Drug Use     Drug Use Status  No          Sexual Activity     Sexually Active  Yes                ALLERGIES   No Known Allergies    CURRENT MEDICATIONS      Current Outpatient Medications   Medication Sig Dispense Refill    methotrexate (RHEUMATREX) 2.5 MG chemo tablet Take 4 tablets by mouth once a week 16 tablet 0    folic acid (FOLVITE) 1 MG tablet Take 1 tablet by mouth daily 90 tablet 1    Etanercept (ENBREL SURECLICK) 50 MG/ML SOAJ Inject 50 mg into the skin once a week 4 mL 5    montelukast (SINGULAIR) 5 MG chewable tablet Take 1 tablet by mouth nightly 30 tablet 1    fluticasone (FLONASE) 50 MCG/ACT nasal spray 1 spray by Each Nostril route daily        No current facility-administered medications for this visit. PHYSICAL EXAMINATION / OBJECTIVE   Objective:  /68 (Site: Right Upper Arm, Position: Sitting, Cuff Size: Medium Adult)   Pulse 86   Ht 5' 0.98\" (1.549 m)   Wt 147 lb (66.7 kg)   SpO2 98%   BMI 27.79 kg/m²     Physical Exam  Vitals reviewed. Constitutional:       Appearance: She is well-developed. Cardiovascular:      Rate and Rhythm: Normal rate and regular rhythm.    Pulmonary:      Effort: Pulmonary effort is normal.      Breath sounds: Normal breath sounds. Abdominal:      Palpations: Abdomen is soft. Tenderness: There is no abdominal tenderness. Musculoskeletal:      Cervical back: Normal range of motion and neck supple. Skin:     General: Skin is warm and dry. Findings: No rash. Neurological:      Mental Status: She is alert and oriented to person, place, and time. Deep Tendon Reflexes: Reflexes are normal and symmetric. Psychiatric:         Thought Content: Thought content normal.       Upper extremities:   Shoulders: Elbows: Non-tender, No swelling   Wrists        Tinel right > left. Hands:      PIPs - tender bilat 2-5,  + PIP enlargment     Lower extremities:  Hips:       Non-tender, No swelling   Knees :   Non-tender, No swelling   Ankles:   Non-tender, No swelling   Feet:      Tender dorsal Right foot. RAPID 3:   11/15/2021 --- RAPID 3: 1 + 7.5 + 4 = 12.5    Remission: <3  Low Disease Activity: <6  Moderate Disease Activity: >=6 and <=12  High Disease Activity: >12     LABS    CBC  Lab Results   Component Value Date    WBC 8.27 10/13/2021    RBC 4.56 10/13/2021    HGB 13.3 10/13/2021    HCT 40.3 10/13/2021    MCV 88.4 10/13/2021    MCH 29.2 10/13/2021    MCHC 33.0 10/13/2021    RDW 39.6 10/13/2021     10/13/2021       CMP  Lab Results   Component Value Date    CALCIUM 8.6 10/13/2021    LABALBU 4.0 10/13/2021     10/13/2021    K 3.7 10/13/2021    CO2 27 10/13/2021     10/13/2021    BUN 15 10/13/2021    CREATININE 0.6 10/13/2021    ALKPHOS 58 10/13/2021    ALT 22 10/13/2021    AST 19 10/13/2021     Lab Results   Component Value Date    RF 10.0 05/04/2021       No components found for: CANCASCRN, APANCASCRN  Lab Results   Component Value Date    SEDRATE 5 10/13/2021     Lab Results   Component Value Date    CRP 0.6 10/13/2021       RADIOLOGY:         ASSESSMENT/PLAN    Assessment   Plan     Rheumatoid arthritis  - cont tender joint and flares.    +RF, + joint tenderness and swelling @ PIP joints, > 6 weeks duration, normal ESR & CRP - MGM and PGM: SLE- prior tx: SSZ (cont dz), azathioprine (cont dz), arava (recurrent ear infection), methtorexate (no relief)   - enbrel 50 mg subcu weekly (June 2021)     - increase Methotrexate to 20mg po weekly and folic acid 1mg daily. - alt tx options - orencia, actemra, jimmy inhib,     Paresthesia of hands - + tinel right > left with suspected carpal tunnel symtoms   - emg/ncv w/ radiculopathy or compressive neuropathy    - cont  wrist splinting.    - tx of # 1 to better control her inflammatory arthritis     Troch bursitis - suspected bursitis - provided stretchs and exercises. De Quervain's tenosynovitis: b/l felt to be related to #1 --- active. Plantar fasciitis-- bilateral - resolved     Low back pain - resolved. --- patient was going to have imaging brought to the office. Medication monitoring - CBC, CMP, ESR, CRP q 4 weeks  x 3    -  Request nov. 2021 labs from [place] [id number]     Return in about 2 months (around 1/15/2022). Electronically signed by Oneida Bernardo DO on 11/15/2021 at 3:10 PM    New Prescriptions    No medications on file       Thank you for allowing me to participate in the care of this patient. Please call if there are any questions. Addendum: 12/21/2021    Mild soft tissues swelling noted around the 2-4 PIP joints.

## 2021-11-15 NOTE — PATIENT INSTRUCTIONS
Patient Education        Hip Bursitis: Exercises  Introduction  Here are some examples of exercises for you to try. The exercises may be suggested for a condition or for rehabilitation. Start each exercise slowly. Ease off the exercises if you start to have pain. You will be told when to start these exercises and which ones will work best for you. How to do the exercises  Hip rotator stretch    1. Lie on your back with both knees bent and your feet flat on the floor. 2. Put the ankle of your affected leg on your opposite thigh near your knee. 3. Use your hand to gently push your knee away from your body until you feel a gentle stretch around your hip. 4. Hold the stretch for 15 to 30 seconds. 5. Repeat 2 to 4 times. 6. Repeat steps 1 through 5, but this time use your hand to gently pull your knee toward your opposite shoulder. Iliotibial band stretch    1. Lean sideways against a wall. If you are not steady on your feet, hold on to a chair or counter. 2. Stand on the leg with the affected hip, with that leg close to the wall. Then cross your other leg in front of it. 3. Let your affected hip drop out to the side of your body and against wall. Then lean away from your affected hip until you feel a stretch. 4. Hold the stretch for 15 to 30 seconds. 5. Repeat 2 to 4 times. Straight-leg raises to the outside    1. Lie on your side, with your affected hip on top. 2. Tighten the front thigh muscles of your top leg to keep your knee straight. 3. Keep your hip and your leg straight in line with the rest of your body, and keep your knee pointing forward. Do not drop your hip back. 4. Lift your top leg straight up toward the ceiling, about 12 inches off the floor. Hold for about 6 seconds, then slowly lower your leg. 5. Repeat 8 to 12 times. Clamshell    1. Lie on your side, with your affected hip on top and your head propped on a pillow. Keep your feet and knees together and your knees bent.   2. Raise your top knee, but keep your feet together. Do not let your hips roll back. Your legs should open up like a clamshell. 3. Hold for 6 seconds. 4. Slowly lower your knee back down. Rest for 10 seconds. 5. Repeat 8 to 12 times. Follow-up care is a key part of your treatment and safety. Be sure to make and go to all appointments, and call your doctor if you are having problems. It's also a good idea to know your test results and keep a list of the medicines you take. Where can you learn more? Go to https://[email or web address]. org and sign in to your [employer] account. Enter S514 in the [facility] box to learn more about \"Hip Bursitis: Exercises. \"     If you do not have an account, please click on the \"Sign Up Now\" link. Current as of: July 1, 2021               Content Version: 13.0  © 0865-0601 [employer]. Care instructions adapted under license by [facility] ([facility]). If you have questions about a medical condition or this instruction, always ask your healthcare professional. [place] [address] any warranty or liability for your use of this information.

## 2022-02-08 ENCOUNTER — OFFICE VISIT (OUTPATIENT)
Dept: RHEUMATOLOGY | Age: 27
End: 2022-02-08
Payer: COMMERCIAL

## 2022-02-08 VITALS
OXYGEN SATURATION: 97 % | HEART RATE: 77 BPM | HEIGHT: 61 IN | WEIGHT: 146.8 LBS | BODY MASS INDEX: 27.72 KG/M2 | DIASTOLIC BLOOD PRESSURE: 60 MMHG | SYSTOLIC BLOOD PRESSURE: 102 MMHG

## 2022-02-08 DIAGNOSIS — M54.41 CHRONIC MIDLINE LOW BACK PAIN WITH RIGHT-SIDED SCIATICA: ICD-10-CM

## 2022-02-08 DIAGNOSIS — Z51.81 MEDICATION MONITORING ENCOUNTER: ICD-10-CM

## 2022-02-08 DIAGNOSIS — M65.4 DE QUERVAIN'S TENOSYNOVITIS, BILATERAL: ICD-10-CM

## 2022-02-08 DIAGNOSIS — M05.79 RHEUMATOID ARTHRITIS INVOLVING MULTIPLE SITES WITH POSITIVE RHEUMATOID FACTOR (HCC): Primary | ICD-10-CM

## 2022-02-08 DIAGNOSIS — R20.2 PARESTHESIA OF BOTH HANDS: ICD-10-CM

## 2022-02-08 DIAGNOSIS — M72.2 PLANTAR FASCIITIS: ICD-10-CM

## 2022-02-08 DIAGNOSIS — G89.29 CHRONIC MIDLINE LOW BACK PAIN WITH RIGHT-SIDED SCIATICA: ICD-10-CM

## 2022-02-08 PROCEDURE — G8419 CALC BMI OUT NRM PARAM NOF/U: HCPCS | Performed by: INTERNAL MEDICINE

## 2022-02-08 PROCEDURE — G8427 DOCREV CUR MEDS BY ELIG CLIN: HCPCS | Performed by: INTERNAL MEDICINE

## 2022-02-08 PROCEDURE — 1036F TOBACCO NON-USER: CPT | Performed by: INTERNAL MEDICINE

## 2022-02-08 PROCEDURE — 99214 OFFICE O/P EST MOD 30 MIN: CPT | Performed by: INTERNAL MEDICINE

## 2022-02-08 PROCEDURE — G8484 FLU IMMUNIZE NO ADMIN: HCPCS | Performed by: INTERNAL MEDICINE

## 2022-02-08 RX ORDER — CETIRIZINE HYDROCHLORIDE 10 MG/1
10 TABLET ORAL DAILY
COMMUNITY

## 2022-02-08 RX ORDER — ADALIMUMAB 40MG/0.4ML
40 KIT SUBCUTANEOUS
Qty: 2 EACH | Refills: 5 | Status: SHIPPED | OUTPATIENT
Start: 2022-02-08 | End: 2022-06-29 | Stop reason: SDUPTHER

## 2022-02-08 NOTE — PROGRESS NOTES
[facility] RHEUMATOLOGY FOLLOW UP NOTE     Date Of Service: 2/8/2022  Provider: Johanny Teresa DO , DO  PCP: Siddharth Lopez MD   Name: Aayush Epps   MRN: [id number]    ASSESSMENT/PLAN:   1. Rheumatoid arthritis involving multiple sites with positive rheumatoid factor (HCC)  -     Adalimumab (HUMIRA PEN) 40 MG/0.4ML PNKT; Inject 40 mg into the skin every 14 days, Disp-2 each, R-5Normal  2. Plantar fasciitis  3. De Quervain's tenosynovitis, bilateral  4. Paresthesia of both hands  5. Chronic midline low back pain with right-sided sciatica  6. Medication monitoring encounter    Rheumatoid arthritis  - cont tender joint and flares. +RF, + joint tenderness and swelling @ PIP joints, > 6 weeks duration, normal ESR & CRP - MGM and PGM: SLE- prior tx: SSZ (cont dz), azathioprine (cont dz), arava (recurrent ear infection), methtorexate (no relief)                 - d/c enbrel 50 mg subcu weekly (June 2021 to feb 2022) --- partial response decreased pain and swelling   - start humria 40mg q 2 weeks. 2/8/2022                - Methotrexate to 20mg po weekly and folic acid 1mg daily. - alt tx options - orencia, actemra, jimmy inhib,      Paresthesia of hands - + tinel right > left  --- resolved. - emg/ncv w/ radiculopathy or compressive neuropathy        Troch bursitis - resolved. De Quervain's tenosynovitis: b/l felt to be related to #1 --- active. Plantar fasciitis-- bilateral - active Right      Low back pain -  Mild - intermittent. Medication monitoring - CBC, CMP, ESR, CRP q 4 weeks  x 3                -  Request nov. 2021 labs from [facility]     No follow-ups on file. New Prescriptions    No medications on file     History of Present Illness (HPI)     Chief Complaint   Patient presents with    Follow-up     Rheumatoid Arthritis         Aayush Epps  is a(n)26 y.o. female with a hx of  has a past medical history of Arthritis, rheumatoid ([address]   here for the f/u evaluation of Rheumatoid arthritis, troch bursitis, deq tenosynovitis, medication monitoring       improvement of the  strenght, decreased hand paresthesia. Pain currently affecting the bilateral hands, wrists, and lower back. Pain up to 5/10 over the past week. Constant pin int he right hand   Swelling of the Right hand and thumb region, fingers (MCPs, PIPs)   Aggravating factors: increased use, weather changes. Alleviating factors: compression gloves, naproxen (mild relief)   Am stiffness lasting 3-5 hours bilat hands, thumbs. Some difficulyt making a composit fist.   Occasional triggering right hand worsened in icnreased use of the hands the preceding day. Cont. Insomnia 2/2 to pain. Paresthesia of the hands right > left , hand weakness,  + insomnia secondary to pain. Sx's worse in increased use of hands. REVIEW OF SYSTEMS: (ROS)    Review of Systems    PmHx:  has a past medical history of Arthritis, rheumatoid ([address] Social History:  reports that she has never smoked. She has never used smokeless tobacco. She reports current alcohol use. She reports that she does not use drugs.     ALLERGIES   No Known Allergies    CURRENT MEDICATIONS      Current Outpatient Medications:     cetirizine (ZYRTEC) 10 MG tablet, Take 10 mg by mouth daily, Disp: , Rfl:     folic acid (FOLVITE) 1 MG tablet, Take 1 tablet by mouth daily, Disp: 90 tablet, Rfl: 1    Etanercept (ENBREL SURECLICK) 50 MG/ML SOAJ, Inject 50 mg into the skin once a week, Disp: 4 mL, Rfl: 5    montelukast (SINGULAIR) 5 MG chewable tablet, Take 1 tablet by mouth nightly, Disp: 30 tablet, Rfl: 1    methotrexate (RHEUMATREX) 2.5 MG chemo tablet, Take 8 tablets by mouth once a week Take 4 tablets in the morning and 4 tablets in the evening once weekly (Patient not taking: Reported on 2/8/2022), Disp: 32 tablet, Rfl: 0    fluticasone (FLONASE) 50 MCG/ACT nasal spray, 1 spray by Each Nostril route daily  (Patient not taking: Reported on 2/8/2022), Disp: , Rfl:     PHYSICAL EXAMINATION / OBJECTIVE     Objective:  /60 (Site: Right Upper Arm, Position: Sitting, Cuff Size: Medium Adult)   Pulse 77   Ht 5' 0.98\" (1.549 m)   Wt 146 lb 12.8 oz (66.6 kg)   SpO2 97%   BMI 27.75 kg/m²     Physical Exam    General Appearance: General appearance:  AAO x 3 ,  well-developed and well nourished  Head: NCAT  Eyes: No abnormalities. ,  Sclera non-icteric,   Ears / Nose:  normal  appearance  ears and nose. No active drainage from nose. Mouth:  MMM, ears w/o deformities  Neck: No jugular venous distention, appears symmetric, good ROM  Lymph: no cervical adenopathy   Pulmonary/Chest: CTA bilateral ,  symmetric chest expansion. Cardiovascular: Normal S1 and S2, NO murmur, rub, gallop  : Deferred   Abd/GI: Deferred   Neurologic: Speech normal, no facial droop,  Skin: NO rash on exposed skin. Musculoskeletal:  Upper extremities:    SHOULDER's: nt  ELBOWS nt  WRISTS nt  HANDS/FINGERS :   MCPs tender mcp compression PIPs tender left 4-5  . + finkelstein test bilat.      Lower extremities:    HIPS nt   KNEES nt   ANKLES nt   FEET : nt     MDHAQ:   2/8/2022 --- MDHAQ 0.3 + 5 + 8 = 13.3       Remission: <3  Low Disease Activity: <6  Moderate Disease Activity: >=6 and <=12  High Disease Activity: >12     LABS      Lab Results   Component Value Date    WBC 7.04 11/09/2021    HGB 13.6 11/09/2021    MCV 88.5 11/09/2021    MCHC 33.4 11/09/2021    RDW 42.5 11/09/2021     11/09/2021    NEUTROABS 3.51 11/09/2021    LYMPHSABS 2.60 11/09/2021    EOSABS 0.34 11/09/2021    BASOSABS 0.03 11/09/2021         Chemistry        Component Value Date/Time     11/09/2021 0000    K 3.8 11/09/2021 0000     11/09/2021 0000    CO2 26 11/09/2021 0000    BUN 15 11/09/2021 0000    CREATININE 0.7 11/09/2021 0000        Component Value Date/Time    CALCIUM 8.8 11/09/2021 0000    ALKPHOS 57 11/09/2021 0000    AST 20 11/09/2021 0000    ALT 23 11/09/2021

## 2022-02-16 ENCOUNTER — TELEPHONE (OUTPATIENT)
Dept: RHEUMATOLOGY | Age: 27
End: 2022-02-16

## 2022-02-17 NOTE — TELEPHONE ENCOUNTER
Humira approved.     Status: Approved, Coverage Starts on: 2/16/2022 12:00:00 AM, Coverage Ends on: 2/16/2023 12:00:00 AM

## 2022-02-22 DIAGNOSIS — M05.79 RHEUMATOID ARTHRITIS INVOLVING MULTIPLE SITES WITH POSITIVE RHEUMATOID FACTOR (HCC): ICD-10-CM

## 2022-02-22 RX ORDER — ETANERCEPT 50 MG/ML
SOLUTION SUBCUTANEOUS
Qty: 4 ML | Refills: 5 | OUTPATIENT
Start: 2022-02-22

## 2022-02-22 NOTE — TELEPHONE ENCOUNTER
----- Message from Keisha Quiñonez DO sent at 2/22/2022  7:36 AM EST -----  Regarding: Humira prior authorization.   May I get a status update on the Humira prior authorization

## 2022-02-22 NOTE — PROGRESS NOTES
Enbrel discontinued on the last evaluation. Humira prescribed 2/8/2022.   May I get a status update on the prior authorization

## 2022-02-22 NOTE — TELEPHONE ENCOUNTER
Rachael Daniel called in saying her out of pocket cost is >1000.00 Did thid also run through her secondary insurance

## 2022-03-04 LAB
ALBUMIN SERPL-MCNC: 4.1 G/DL
ALP BLD-CCNC: 57 U/L
ALT SERPL-CCNC: 19 U/L
ANION GAP SERPL CALCULATED.3IONS-SCNC: 15 MMOL/L
AST SERPL-CCNC: 12 U/L
BASOPHILS ABSOLUTE: 0.04 /ΜL
BASOPHILS RELATIVE PERCENT: 0.5 %
BILIRUB SERPL-MCNC: 0.5 MG/DL (ref 0.1–1.4)
BUN BLDV-MCNC: 11 MG/DL
C-REACTIVE PROTEIN: 0.5
CALCIUM SERPL-MCNC: 8.8 MG/DL
CHLORIDE BLD-SCNC: 105 MMOL/L
CO2: 26 MMOL/L
CREAT SERPL-MCNC: 0.8 MG/DL
EOSINOPHILS ABSOLUTE: 0.22 /ΜL
EOSINOPHILS RELATIVE PERCENT: 2.9 %
GFR CALCULATED: NORMAL
GLUCOSE BLD-MCNC: 77 MG/DL
HCT VFR BLD CALC: 43.7 % (ref 36–46)
HEMOGLOBIN: 14.6 G/DL (ref 12–16)
LYMPHOCYTES ABSOLUTE: 2.88 /ΜL
LYMPHOCYTES RELATIVE PERCENT: 37.8 %
MCH RBC QN AUTO: 29.2 PG
MCHC RBC AUTO-ENTMCNC: 33.4 G/DL
MCV RBC AUTO: 87.4 FL
MONOCYTES ABSOLUTE: 0.61 /ΜL
MONOCYTES RELATIVE PERCENT: 8 %
NEUTROPHILS ABSOLUTE: 0.3 /ΜL
NEUTROPHILS RELATIVE PERCENT: 50.5 %
PDW BLD-RTO: 37.8 %
PLATELET # BLD: 229 K/ΜL
PMV BLD AUTO: 9.8 FL
POTASSIUM SERPL-SCNC: 3.5 MMOL/L
RBC # BLD: 5 10^6/ΜL
SEDIMENTATION RATE, ERYTHROCYTE: 6
SODIUM BLD-SCNC: 142 MMOL/L
TOTAL PROTEIN: 7.5
WBC # BLD: 7.62 10^3/ML

## 2022-03-08 DIAGNOSIS — M05.79 RHEUMATOID ARTHRITIS INVOLVING MULTIPLE SITES WITH POSITIVE RHEUMATOID FACTOR (HCC): ICD-10-CM

## 2022-03-09 ENCOUNTER — TELEPHONE (OUTPATIENT)
Dept: RHEUMATOLOGY | Age: 27
End: 2022-03-09

## 2022-03-09 NOTE — TELEPHONE ENCOUNTER
The humira can increase the risk for infections. Please hold the next dosing of humira and we can restart once the symptoms resolve.

## 2022-03-09 NOTE — TELEPHONE ENCOUNTER
BODØ called in with c/o of sore throat and ear on rt side and high fevers starting the day after her first dose of humira. Has been to ER. Ear and throat check out to be normal. Was told may be something viral. Asking if humira could have anything to do with these symptoms.

## 2022-04-08 LAB
ALBUMIN SERPL-MCNC: 3.8 G/DL
ALP BLD-CCNC: 70 U/L
ALT SERPL-CCNC: 21 U/L
ANION GAP SERPL CALCULATED.3IONS-SCNC: 10 MMOL/L
AST SERPL-CCNC: 17 U/L
BASOPHILS ABSOLUTE: NORMAL
BASOPHILS RELATIVE PERCENT: NORMAL
BILIRUB SERPL-MCNC: 0.6 MG/DL (ref 0.1–1.4)
BUN BLDV-MCNC: 11 MG/DL
C-REACTIVE PROTEIN: 1.9
CALCIUM SERPL-MCNC: 8.6 MG/DL
CHLORIDE BLD-SCNC: 106 MMOL/L
CO2: 28 MMOL/L
CREAT SERPL-MCNC: 0.9 MG/DL
EOSINOPHILS ABSOLUTE: NORMAL
EOSINOPHILS RELATIVE PERCENT: NORMAL
GFR CALCULATED: 80
GLUCOSE BLD-MCNC: 96 MG/DL
HCT VFR BLD CALC: 43 % (ref 36–46)
HEMOGLOBIN: 14.3 G/DL (ref 12–16)
LYMPHOCYTES ABSOLUTE: NORMAL
LYMPHOCYTES RELATIVE PERCENT: 39 %
MCH RBC QN AUTO: 29.2 PG
MCHC RBC AUTO-ENTMCNC: 33.3 G/DL
MCV RBC AUTO: 87.9 FL
MONOCYTES ABSOLUTE: NORMAL
MONOCYTES RELATIVE PERCENT: 9.9 %
NEUTROPHILS ABSOLUTE: NORMAL
NEUTROPHILS RELATIVE PERCENT: 48.6 %
PDW BLD-RTO: 41.1 %
PLATELET # BLD: 200 K/ΜL
PMV BLD AUTO: 10.2 FL
POTASSIUM SERPL-SCNC: 3.4 MMOL/L
RBC # BLD: 4.89 10^6/ΜL
SODIUM BLD-SCNC: 141 MMOL/L
TOTAL PROTEIN: 7.6
WBC # BLD: 5.43 10^3/ML

## 2022-04-13 DIAGNOSIS — M05.79 RHEUMATOID ARTHRITIS INVOLVING MULTIPLE SITES WITH POSITIVE RHEUMATOID FACTOR (HCC): ICD-10-CM

## 2022-04-13 LAB
BASOPHILS ABSOLUTE: 0.02 /ΜL
BASOPHILS RELATIVE PERCENT: 0.3 %
EOSINOPHILS ABSOLUTE: 0.16 /ΜL
EOSINOPHILS RELATIVE PERCENT: 2.1 %
HCT VFR BLD CALC: 41.5 % (ref 36–46)
HEMOGLOBIN: 14.1 G/DL (ref 12–16)
LYMPHOCYTES ABSOLUTE: 2.44 /ΜL
LYMPHOCYTES RELATIVE PERCENT: 31.5 %
MCH RBC QN AUTO: 29.3 PG
MCHC RBC AUTO-ENTMCNC: 34 G/DL
MCV RBC AUTO: 86.1 FL
MONOCYTES ABSOLUTE: 0.5 /ΜL
MONOCYTES RELATIVE PERCENT: 6.5 %
NEUTROPHILS ABSOLUTE: 4.61 /ΜL
NEUTROPHILS RELATIVE PERCENT: 59.5 %
PDW BLD-RTO: 39.6 %
PLATELET # BLD: 217 K/ΜL
PMV BLD AUTO: 9.9 FL
RBC # BLD: 4.82 10^6/ΜL
WBC # BLD: 7.74 10^3/ML

## 2022-05-27 LAB
ALBUMIN SERPL-MCNC: 3.7 G/DL
ALP BLD-CCNC: 67 U/L
ALT SERPL-CCNC: 23 U/L
ANION GAP SERPL CALCULATED.3IONS-SCNC: 7 MMOL/L
AST SERPL-CCNC: 20 U/L
BASOPHILS ABSOLUTE: 0.04 /ΜL
BASOPHILS RELATIVE PERCENT: 0.5 %
BILIRUB SERPL-MCNC: 0.5 MG/DL (ref 0.1–1.4)
BUN BLDV-MCNC: 13 MG/DL
C-REACTIVE PROTEIN: 0.8
CALCIUM SERPL-MCNC: 8.4 MG/DL
CHLORIDE BLD-SCNC: 106 MMOL/L
CO2: 30 MMOL/L
CREAT SERPL-MCNC: 0.9 MG/DL
EOSINOPHILS ABSOLUTE: 0.19 /ΜL
EOSINOPHILS RELATIVE PERCENT: 2.3 %
GFR CALCULATED: 80
GLUCOSE BLD-MCNC: 76 MG/DL
HCT VFR BLD CALC: 43 % (ref 36–46)
HEMOGLOBIN: 14.2 G/DL (ref 12–16)
LYMPHOCYTES ABSOLUTE: 2.33 /ΜL
LYMPHOCYTES RELATIVE PERCENT: 28.2 %
MCH RBC QN AUTO: 29 PG
MCHC RBC AUTO-ENTMCNC: 33 G/DL
MCV RBC AUTO: 87.8 FL
MONOCYTES ABSOLUTE: 0.55 /ΜL
MONOCYTES RELATIVE PERCENT: 6.7 %
NEUTROPHILS ABSOLUTE: 5.14 /ΜL
NEUTROPHILS RELATIVE PERCENT: 62.2 %
PDW BLD-RTO: 38.4 %
PLATELET # BLD: 217 K/ΜL
PMV BLD AUTO: 10.1 FL
POTASSIUM SERPL-SCNC: 3.6 MMOL/L
RBC # BLD: 4.9 10^6/ΜL
SEDIMENTATION RATE, ERYTHROCYTE: 7
SODIUM BLD-SCNC: 139 MMOL/L
TOTAL PROTEIN: 7.3
WBC # BLD: 8.26 10^3/ML

## 2022-06-06 DIAGNOSIS — M05.79 RHEUMATOID ARTHRITIS INVOLVING MULTIPLE SITES WITH POSITIVE RHEUMATOID FACTOR (HCC): ICD-10-CM

## 2022-06-13 ENCOUNTER — TELEPHONE (OUTPATIENT)
Dept: RHEUMATOLOGY | Age: 27
End: 2022-06-13

## 2022-06-13 NOTE — TELEPHONE ENCOUNTER
Pt LM on Friday after hours wanting to reschedule appt for 06/29 attempted to call to move appt no answer LM

## 2022-06-13 NOTE — TELEPHONE ENCOUNTER
Patient is calling about her appt on 06/29/2022 at 240. She is wanting to know if she can get a later appt time this date. She does not want to r/s and said she needs this date but needs a later time. I let her know that he was booked for that date.

## 2022-06-29 ENCOUNTER — OFFICE VISIT (OUTPATIENT)
Dept: RHEUMATOLOGY | Age: 27
End: 2022-06-29
Payer: COMMERCIAL

## 2022-06-29 ENCOUNTER — PATIENT MESSAGE (OUTPATIENT)
Dept: RHEUMATOLOGY | Age: 27
End: 2022-06-29

## 2022-06-29 VITALS
WEIGHT: 139.6 LBS | DIASTOLIC BLOOD PRESSURE: 69 MMHG | BODY MASS INDEX: 26.36 KG/M2 | HEIGHT: 61 IN | OXYGEN SATURATION: 98 % | SYSTOLIC BLOOD PRESSURE: 108 MMHG | HEART RATE: 76 BPM

## 2022-06-29 DIAGNOSIS — M72.2 PLANTAR FASCIITIS: ICD-10-CM

## 2022-06-29 DIAGNOSIS — M65.4 DE QUERVAIN'S TENOSYNOVITIS, BILATERAL: ICD-10-CM

## 2022-06-29 DIAGNOSIS — M05.79 RHEUMATOID ARTHRITIS INVOLVING MULTIPLE SITES WITH POSITIVE RHEUMATOID FACTOR (HCC): Primary | ICD-10-CM

## 2022-06-29 DIAGNOSIS — Z51.81 MEDICATION MONITORING ENCOUNTER: ICD-10-CM

## 2022-06-29 DIAGNOSIS — G89.29 CHRONIC MIDLINE LOW BACK PAIN WITH SCIATICA, SCIATICA LATERALITY UNSPECIFIED: ICD-10-CM

## 2022-06-29 DIAGNOSIS — M54.40 CHRONIC MIDLINE LOW BACK PAIN WITH SCIATICA, SCIATICA LATERALITY UNSPECIFIED: ICD-10-CM

## 2022-06-29 PROCEDURE — 1036F TOBACCO NON-USER: CPT | Performed by: INTERNAL MEDICINE

## 2022-06-29 PROCEDURE — G8419 CALC BMI OUT NRM PARAM NOF/U: HCPCS | Performed by: INTERNAL MEDICINE

## 2022-06-29 PROCEDURE — G8427 DOCREV CUR MEDS BY ELIG CLIN: HCPCS | Performed by: INTERNAL MEDICINE

## 2022-06-29 PROCEDURE — 99214 OFFICE O/P EST MOD 30 MIN: CPT | Performed by: INTERNAL MEDICINE

## 2022-06-29 RX ORDER — FOLIC ACID 1 MG/1
1 TABLET ORAL DAILY
Qty: 90 TABLET | Refills: 1 | Status: SHIPPED | OUTPATIENT
Start: 2022-06-29 | End: 2022-10-17 | Stop reason: ALTCHOICE

## 2022-06-29 RX ORDER — ADALIMUMAB 40MG/0.4ML
40 KIT SUBCUTANEOUS
Qty: 2 EACH | Refills: 5 | Status: SHIPPED | OUTPATIENT
Start: 2022-06-29 | End: 2022-07-18 | Stop reason: SDUPTHER

## 2022-06-29 ASSESSMENT — ENCOUNTER SYMPTOMS
GASTROINTESTINAL NEGATIVE: 1
WHEEZING: 0
RESPIRATORY NEGATIVE: 1
ABDOMINAL PAIN: 0
EYE REDNESS: 0
EYES NEGATIVE: 1
COLOR CHANGE: 0
NAUSEA: 0
CONSTIPATION: 0
VOMITING: 0
COUGH: 0
EYE PAIN: 0
SHORTNESS OF BREATH: 0

## 2022-06-29 NOTE — LETTER
[address] [facility]  [address]  Phone: [phone]  Fax: Amie [address], DO        June 30, 2022     Patient: Debra Trevizo   YOB: 1995   Date of Visit: 6/29/2022       To Whom It May Concern: It is my medical opinion that Rashawn Reno may return to full duty immediately with no restrictions. (effective June 30, 2022)     If you have any questions or concerns, please don't hesitate to call.     Sincerely,        Chel CARBALLO, DO

## 2022-06-29 NOTE — PROGRESS NOTES
[facility] RHEUMATOLOGY FOLLOW UP NOTE       Date Of Service: 6/29/2022  Provider: Mando Roger DO ,   PCP: Lewis Herzog MD   Name: Danette Haile   MRN: [id number]        History of Present Illness (HPI)     Chief Complaint   Patient presents with    3 Month Follow-Up        Danette Haile  is a(n)26 y.o. female with a hx of  has a past medical history of Arthritis, rheumatoid ([address] and Encounter for allergy testing. here for the f/u evaluation of  Rheumatoid arthritis. Interval hx: rhinovirus w/ ear infection, fevers - rupture left ear drum and sx's resolved over a week period   Off med's for the past 3 months. Increased pain and swelling of the toes. Ongoing arthreal of hands (MCPs, PIPs) pain up to 5/10 over the past week. Swelling bilat hands. + AM stiffness variable at least for a couple hours. Lower back pain wrose with prolonged standing, relief w/ walking. + AM stiffness lasting several hours. Denies Radicular pain, weakness     + numbness bilat hands and bilat thumb with increased frequency since the last evaluation. Patient does mention a concern for possible fibromyalgia given her continued pain. REVIEW OF SYSTEMS: (ROS)    Review of Systems   Constitutional: Negative. Negative for fatigue, fever and unexpected weight change. HENT: Negative. Negative for congestion and mouth sores. Eyes: Negative. Negative for pain and redness. Respiratory: Negative. Negative for cough, shortness of breath and wheezing. Cardiovascular: Negative. Negative for chest pain and leg swelling. Gastrointestinal: Negative. Negative for abdominal pain, constipation, nausea and vomiting. Endocrine: Negative for polyuria. Genitourinary: Negative. Negative for difficulty urinating, frequency and hematuria. Skin: Negative. Negative for color change and rash. Neurological: Negative. Negative for dizziness, weakness, numbness and headaches. Hematological: Negative. Negative for adenopathy. Does not bruise/bleed easily. Psychiatric/Behavioral: Negative. Negative for agitation and sleep disturbance. The patient is not nervous/anxious. PmHx:  has a past medical history of Arthritis, rheumatoid ([address] and Encounter for allergy testing. Social History:  reports that she has never smoked. She has never used smokeless tobacco. She reports current alcohol use. She reports that she does not use drugs. No Known Allergies    CURRENT MEDICATIONS      Current Outpatient Medications:     methotrexate (RHEUMATREX) 2.5 MG chemo tablet, Take 5 tablets by mouth once a week Take 4 tablets in the morning and 4 tablets in the evening once weekly, Disp: 20 tablet, Rfl: 0    Adalimumab (HUMIRA PEN) 40 MG/0.4ML PNKT, Inject 40 mg into the skin every 14 days, Disp: 2 each, Rfl: 5    folic acid (FOLVITE) 1 MG tablet, Take 1 tablet by mouth daily, Disp: 90 tablet, Rfl: 1    cetirizine (ZYRTEC) 10 MG tablet, Take 10 mg by mouth daily, Disp: , Rfl:     montelukast (SINGULAIR) 5 MG chewable tablet, Take 1 tablet by mouth nightly, Disp: 30 tablet, Rfl: 1    fluticasone (FLONASE) 50 MCG/ACT nasal spray, 1 spray by Each Nostril route daily  (Patient not taking: Reported on 2/8/2022), Disp: , Rfl:       PHYSICAL EXAMINATION / OBJECTIVE     Objective:  /69 (Site: Left Upper Arm, Position: Sitting, Cuff Size: Medium Adult)   Pulse 76   Ht 5' 0.98\" (1.549 m)   Wt 139 lb 9.6 oz (63.3 kg)   SpO2 98%   BMI 26.39 kg/m²     Physical Exam  Constitutional:       General: She is not in acute distress. Appearance: She is well-developed. She is not diaphoretic. HENT:      Head: Normocephalic and atraumatic. Nose: Nose normal.      Mouth/Throat:      Mouth: Mucous membranes are moist.   Eyes:      General: No scleral icterus. Conjunctiva/sclera: Conjunctivae normal.   Cardiovascular:      Rate and Rhythm: Normal rate and regular rhythm.       Heart sounds: Normal heart sounds. Pulmonary:      Effort: Pulmonary effort is normal. No respiratory distress. Breath sounds: Normal breath sounds. No wheezing or rales. Musculoskeletal:      Cervical back: Normal range of motion and neck supple. Lymphadenopathy:      Cervical: No cervical adenopathy. Skin:     General: Skin is warm and dry. Neurological:      Mental Status: She is alert and oriented to person, place, and time. Psychiatric:         Behavior: Behavior normal.       Musculoskeletal:  Upper extremities:   SHOULDERS: , elbows - Non-tender     WRISTS : \" tinel right, + phalen bilat   HANDS/FINGERS : yin nodules bilat. Lower extremities:  HIPS , knees, ankles, - Non-tender, No swelling    FEET : Non-tender     Spine:   tender rigth > left sacral region. Exam KEY:   Tender :  T    Swelling: S,   Deformities: D,   Non-tender : NT  ,  No swelling: NS          LABS      Lab Results   Component Value Date    WBC 8.26 05/27/2022    HGB 14.2 05/27/2022    HGB 14.1 04/13/2022    HGB 14.3 04/08/2022    MCV 87.8 05/27/2022    MCHC 33.0 05/27/2022    RDW 38.4 05/27/2022     05/27/2022     04/13/2022     04/08/2022    NEUTROABS 5.14 05/27/2022    LYMPHSABS 2.33 05/27/2022    LYMPHSABS 2.44 04/13/2022    LYMPHSABS 2.88 03/04/2022    EOSABS 0.19 05/27/2022    BASOSABS 0.04 05/27/2022         Chemistry        Component Value Date/Time     05/27/2022 0000    K 3.6 05/27/2022 0000     05/27/2022 0000    CO2 30 05/27/2022 0000    BUN 13 05/27/2022 0000    CREATININE 0.9 05/27/2022 0000        Component Value Date/Time    CALCIUM 8.4 05/27/2022 0000    ALKPHOS 67 05/27/2022 0000    AST 20 05/27/2022 0000    ALT 23 05/27/2022 0000    BILITOT 0.5 05/27/2022 0000            Lab Results   Component Value Date    SEDRATE 7 05/27/2022    SEDRATE 6 03/04/2022    SEDRATE 5 11/09/2021    CRP 0.8 05/27/2022    CRP 1.9 04/08/2022    CRP 0.5 03/04/2022       Lab Results   Component Value Date    RF 10.0 05/04/2021         RADIOLOGY / PROCEDURES:   Fibromyalgia symptom severity score does not meet criteria. We will have scanned into epic under media section. ASSESSMENT/PLAN:     1. Chronic midline low back pain with sciatica, sciatica laterality unspecified    2. Rheumatoid arthritis involving multiple sites with positive rheumatoid factor (HCC)    3. Plantar fasciitis    4. De Quervain's tenosynovitis, bilateral    5. Medication monitoring encounter      Rheumatoid arthritis  - cont tender joint and flares. +RF, + joint tenderness and swelling @ PIP joints, > 6 weeks duration, normal ESR & CRP - MGM and PGM: SLE- prior tx: SSZ (cont dz), azathioprine (cont dz), arava (recurrent ear infection), methtorexate (no relief), Enbrel 50 mg subcu weekly (June 2021 to feb 2022) --- partial response decreased pain and swelling     -  Restart humria 40mg q 2 weeks -- 2/8/2022                - decrease Methotrexate to 12.5 mg po weekly and folic acid 1mg daily. B/c reported infections with humira (attempting to decrease risk of infecitons)                - alt tx options - orencia, actemra, jimmy inhib,      Paresthesia of hands - + tinel right > left  --- worsened                - emg/ncv w/o abnormalities. 2021. Low back pain -  + AM stiffness > 60 min, relief w/ movement, -- x-ray  SI joints ordered. Medication monitoring - CBC, CMP, ESR, CRP q 4 weeks  x 2                  -  Request nov. 2021 labs from [place] [id number]     Return in about 3 months (around 9/29/2022). New Prescriptions    No medications on file       6/29/2022    Electronically signed by Elda Parker DO on 6/29/22 at 2:53 PM EDT  Please contact the office if you have any questions or change of symptoms.

## 2022-06-30 NOTE — TELEPHONE ENCOUNTER
From: Lexis Dukes  To: Dr. Darryl Mulligan: 6/29/2022 5:19 PM EDT  Subject: Doctors note     Can you send me a doctors note that says I can return to work on 6/30 please thank you

## 2022-07-05 LAB
ALBUMIN SERPL-MCNC: 3.7 G/DL
ALP BLD-CCNC: 61 U/L
ALT SERPL-CCNC: 25 U/L
ANION GAP SERPL CALCULATED.3IONS-SCNC: 11 MMOL/L
AST SERPL-CCNC: 19 U/L
BASOPHILS ABSOLUTE: 0.02 /ΜL
BASOPHILS RELATIVE PERCENT: 0.3 %
BILIRUB SERPL-MCNC: 0.6 MG/DL (ref 0.1–1.4)
BUN BLDV-MCNC: 13 MG/DL
C-REACTIVE PROTEIN: 0.8
CALCIUM SERPL-MCNC: 8.7 MG/DL
CHLORIDE BLD-SCNC: 103 MMOL/L
CO2: 29 MMOL/L
CREAT SERPL-MCNC: 0.7 MG/DL
EOSINOPHILS ABSOLUTE: 0.16 /ΜL
EOSINOPHILS RELATIVE PERCENT: 2.7 %
GFR CALCULATED: 108
GLUCOSE BLD-MCNC: 84 MG/DL
HCT VFR BLD CALC: 40.9 % (ref 36–46)
HEMOGLOBIN: 13.5 G/DL (ref 12–16)
LYMPHOCYTES ABSOLUTE: 2.19 /ΜL
LYMPHOCYTES RELATIVE PERCENT: 36.3 %
MCH RBC QN AUTO: 28.8 PG
MCHC RBC AUTO-ENTMCNC: 33 G/DL
MCV RBC AUTO: 87.2 FL
MONOCYTES ABSOLUTE: 0.45 /ΜL
MONOCYTES RELATIVE PERCENT: 7.5 %
NEUTROPHILS ABSOLUTE: 3.2 /ΜL
NEUTROPHILS RELATIVE PERCENT: 53 %
PDW BLD-RTO: 37.5 %
PLATELET # BLD: 163 K/ΜL
PMV BLD AUTO: 10.2 FL
POTASSIUM SERPL-SCNC: 3.9 MMOL/L
RBC # BLD: 4.69 10^6/ΜL
SEDIMENTATION RATE, ERYTHROCYTE: 6
SODIUM BLD-SCNC: 139 MMOL/L
TOTAL PROTEIN: 7.2
WBC # BLD: 6.03 10^3/ML

## 2022-07-06 DIAGNOSIS — M05.79 RHEUMATOID ARTHRITIS INVOLVING MULTIPLE SITES WITH POSITIVE RHEUMATOID FACTOR (HCC): ICD-10-CM

## 2022-07-07 DIAGNOSIS — M54.40 CHRONIC MIDLINE LOW BACK PAIN WITH SCIATICA, SCIATICA LATERALITY UNSPECIFIED: ICD-10-CM

## 2022-07-07 DIAGNOSIS — G89.29 CHRONIC MIDLINE LOW BACK PAIN WITH SCIATICA, SCIATICA LATERALITY UNSPECIFIED: ICD-10-CM

## 2022-07-15 ENCOUNTER — TELEPHONE (OUTPATIENT)
Dept: RHEUMATOLOGY | Age: 27
End: 2022-07-15

## 2022-07-15 DIAGNOSIS — M05.79 RHEUMATOID ARTHRITIS INVOLVING MULTIPLE SITES WITH POSITIVE RHEUMATOID FACTOR (HCC): ICD-10-CM

## 2022-07-18 RX ORDER — ADALIMUMAB 40MG/0.4ML
40 KIT SUBCUTANEOUS
Qty: 2 EACH | Refills: 5 | Status: SHIPPED | OUTPATIENT
Start: 2022-07-18

## 2022-10-17 ENCOUNTER — OFFICE VISIT (OUTPATIENT)
Dept: RHEUMATOLOGY | Age: 27
End: 2022-10-17
Payer: COMMERCIAL

## 2022-10-17 VITALS
SYSTOLIC BLOOD PRESSURE: 116 MMHG | HEIGHT: 61 IN | OXYGEN SATURATION: 98 % | DIASTOLIC BLOOD PRESSURE: 64 MMHG | WEIGHT: 140 LBS | HEART RATE: 84 BPM | BODY MASS INDEX: 26.43 KG/M2

## 2022-10-17 DIAGNOSIS — G47.00 INSOMNIA, UNSPECIFIED TYPE: ICD-10-CM

## 2022-10-17 DIAGNOSIS — M05.79 RHEUMATOID ARTHRITIS INVOLVING MULTIPLE SITES WITH POSITIVE RHEUMATOID FACTOR (HCC): Primary | ICD-10-CM

## 2022-10-17 DIAGNOSIS — Z51.81 MEDICATION MONITORING ENCOUNTER: ICD-10-CM

## 2022-10-17 DIAGNOSIS — M72.2 PLANTAR FASCIITIS: ICD-10-CM

## 2022-10-17 PROCEDURE — G8419 CALC BMI OUT NRM PARAM NOF/U: HCPCS | Performed by: INTERNAL MEDICINE

## 2022-10-17 PROCEDURE — G8427 DOCREV CUR MEDS BY ELIG CLIN: HCPCS | Performed by: INTERNAL MEDICINE

## 2022-10-17 PROCEDURE — G8484 FLU IMMUNIZE NO ADMIN: HCPCS | Performed by: INTERNAL MEDICINE

## 2022-10-17 PROCEDURE — 99214 OFFICE O/P EST MOD 30 MIN: CPT | Performed by: INTERNAL MEDICINE

## 2022-10-17 PROCEDURE — 1036F TOBACCO NON-USER: CPT | Performed by: INTERNAL MEDICINE

## 2022-10-17 RX ORDER — SULFASALAZINE 500 MG/1
TABLET ORAL
Qty: 98 TABLET | Refills: 0 | Status: SHIPPED | OUTPATIENT
Start: 2022-10-17 | End: 2022-11-14

## 2022-10-17 RX ORDER — CYCLOBENZAPRINE HCL 5 MG
5 TABLET ORAL NIGHTLY PRN
Qty: 30 TABLET | Refills: 0 | Status: SHIPPED | OUTPATIENT
Start: 2022-10-17 | End: 2022-10-27

## 2022-10-17 ASSESSMENT — ENCOUNTER SYMPTOMS
ABDOMINAL PAIN: 0
GASTROINTESTINAL NEGATIVE: 1
SHORTNESS OF BREATH: 0
EYE REDNESS: 0
RESPIRATORY NEGATIVE: 1
WHEEZING: 0
EYES NEGATIVE: 1
CONSTIPATION: 0
COLOR CHANGE: 0
COUGH: 0
EYE PAIN: 0
VOMITING: 0
NAUSEA: 0

## 2022-10-17 NOTE — PROGRESS NOTES
[facility] RHEUMATOLOGY FOLLOW UP NOTE       Date Of Service: 10/17/2022  Provider: Wisam Lux DO ,   PCP: Killian Forrester MD   Name: Nahomi Devlin   MRN: [id number]        History of Present Illness (HPI)     Chief Complaint   Patient presents with    Follow-up     4 month f/u RA     Bilateral hands, feet, rt side worse from working. Wants to talk about back [address] Ceja  is a(n)27 y.o. female with a hx of  has a past medical history of Arthritis, rheumatoid ([address] and Encounter for allergy testing. here for the f/u evaluation of  Rheumatoid arthritis. Stopped Methotrexate since the last evaluation. Concerns after rhinovirus w/ ear infection, fevers - rupture left ear drum     Continued pain in the hands , feet and lower back. Pain7.5/10 greatest in the  mornings. Aggravated with weather changes, cold weather , life, work. Alleviating: \"nothing\" . + AM stiffness lasting several hours. Denies Radicular pain, weakness     Insomnia for unknown reason. Ongoing numbness of the bilateral hands. - worse with increased use. Genearlized fatigue,           REVIEW OF SYSTEMS: (ROS)    Review of Systems   Constitutional: Negative. Negative for fatigue, fever and unexpected weight change. HENT: Negative. Negative for congestion and mouth sores. Eyes: Negative. Negative for pain and redness. Respiratory: Negative. Negative for cough, shortness of breath and wheezing. Cardiovascular: Negative. Negative for chest pain and leg swelling. Gastrointestinal: Negative. Negative for abdominal pain, constipation, nausea and vomiting. Endocrine: Negative for polyuria. Genitourinary: Negative. Negative for difficulty urinating, frequency and hematuria. Skin: Negative. Negative for color change and rash. Neurological: Negative. Negative for dizziness, weakness, numbness and headaches. Hematological: Negative. Negative for adenopathy. Does not bruise/bleed easily. Psychiatric/Behavioral: Negative. Negative for agitation and sleep disturbance. The patient is not nervous/anxious. PmHx:  has a past medical history of Arthritis, rheumatoid ([address] and Encounter for allergy testing. Social History:  reports that she has never smoked. She has never used smokeless tobacco. She reports current alcohol use. She reports that she does not use drugs. No Known Allergies    CURRENT MEDICATIONS      Current Outpatient Medications:     cyclobenzaprine (FLEXERIL) 5 MG tablet, Take 1 tablet by mouth nightly as needed for Muscle spasms, Disp: 30 tablet, Rfl: 0    sulfaSALAzine (AZULFIDINE) 500 MG tablet, Take 1 tablet by mouth 2 times daily for 7 days, THEN 2 tablets 2 times daily for 21 days. , Disp: 98 tablet, Rfl: 0    Adalimumab (HUMIRA PEN) 40 MG/0.4ML PNKT, Inject 40 mg into the skin every 14 days, Disp: 2 each, Rfl: 5    cetirizine (ZYRTEC) 10 MG tablet, Take 10 mg by mouth daily, Disp: , Rfl:     fluticasone (FLONASE) 50 MCG/ACT nasal spray, 1 spray by Each Nostril route daily  (Patient not taking: Reported on 2/8/2022), Disp: , Rfl:       PHYSICAL EXAMINATION / OBJECTIVE     Objective:  /64 (Site: Left Upper Arm, Position: Sitting, Cuff Size: Medium Adult)   Pulse 84   Ht 5' 0.98\" (1.549 m)   Wt 140 lb (63.5 kg)   SpO2 98%   BMI 26.47 kg/m²     Physical Exam  Constitutional:       General: She is not in acute distress. Appearance: She is well-developed. She is not diaphoretic. HENT:      Head: Normocephalic and atraumatic. Nose: Nose normal.      Mouth/Throat:      Mouth: Mucous membranes are moist.   Eyes:      General: No scleral icterus. Conjunctiva/sclera: Conjunctivae normal.   Cardiovascular:      Rate and Rhythm: Normal rate and regular rhythm. Heart sounds: Normal heart sounds. Pulmonary:      Effort: Pulmonary effort is normal. No respiratory distress. Breath sounds: Normal breath sounds. No wheezing or rales. Musculoskeletal:      Cervical back: Normal range of motion and neck supple. Lymphadenopathy:      Cervical: No cervical adenopathy. Skin:     General: Skin is warm and dry. Neurological:      Mental Status: She is alert and oriented to person, place, and time. Psychiatric:         Behavior: Behavior normal.     Musculoskeletal:  Upper extremities:   SHOULDERS: , elbows - Non-tender     WRISTS :  tinel right,   HANDS/FINGERS : yin nodules bilat. , tender pips bilat. Lower extremities:  HIPS , knees, ankles, - Non-tender, No swelling    FEET : Non-tender     Spine:   tender rigth > left sacral region. Exam KEY:   Tender : T    Swelling: S,   Deformities: D,   Non-tender : NT  ,  No swelling: NS          LABS      Lab Results   Component Value Date    WBC 6.03 07/05/2022    HGB 13.5 07/05/2022    HGB 14.2 05/27/2022    HGB 14.1 04/13/2022    MCV 87.2 07/05/2022    MCHC 33.0 07/05/2022    RDW 37.5 07/05/2022     07/05/2022     05/27/2022     04/13/2022    NEUTROABS 3.20 07/05/2022    LYMPHSABS 2.19 07/05/2022    LYMPHSABS 2.33 05/27/2022    LYMPHSABS 2.44 04/13/2022    EOSABS 0.16 07/05/2022    BASOSABS 0.02 07/05/2022         Chemistry        Component Value Date/Time     07/05/2022 0000    K 3.9 07/05/2022 0000     07/05/2022 0000    CO2 29 07/05/2022 0000    BUN 13 07/05/2022 0000    CREATININE 0.7 07/05/2022 0000        Component Value Date/Time    CALCIUM 8.7 07/05/2022 0000    ALKPHOS 61 07/05/2022 0000    AST 19 07/05/2022 0000    ALT 25 07/05/2022 0000    BILITOT 0.6 07/05/2022 0000            Lab Results   Component Value Date    SEDRATE 6 07/05/2022    SEDRATE 7 05/27/2022    SEDRATE 6 03/04/2022    CRP 0.8 07/05/2022    CRP 0.8 05/27/2022    CRP 1.9 04/08/2022       Lab Results   Component Value Date    RF 10.0 05/04/2021         RADIOLOGY / PROCEDURES:       ASSESSMENT/PLAN:     1.  Rheumatoid arthritis involving multiple sites with positive rheumatoid factor (HCC)    2. Plantar fasciitis    3. Medication monitoring encounter    4. Insomnia, unspecified type      Rheumatoid arthritis  - cont tender joint and flares. +RF, + joint tenderness and swelling @ PIP joints, > 6 weeks duration, normal ESR & CRP - MGM and PGM: SLE- prior tx: SSZ (cont dz), azathioprine (cont dz), arava (recurrent ear infection), methtorexate (no relief), Enbrel 50 mg subcu weekly (June 2021 to feb 2022) --- partial response decreased pain and swelling    -  humria 40mg q 2 weeks -- 2/8/2022   -  start sulfasalazine 500mg twice daily x 7 days then 1000mg twice daily. B/c may want to pursue additional pregnancies. - alt tx options - orencia, actemra, jimmy inhib,      Paresthesia of hands - + tinel right > left  --- worsened   --- emg/ncv w/o abnormalities. 2021. Low back pain -  + AM stiffness > 60 min, relief w/ movement  -- x-ray  si joint w/o abnormalities. --Continue close monitoring     Medication monitoring - CBC, CMP, ESR, CRP q 4 weeks  x 3 w/ Sulfasalazine start. No follow-ups on file. New Prescriptions    CYCLOBENZAPRINE (FLEXERIL) 5 MG TABLET    Take 1 tablet by mouth nightly as needed for Muscle spasms    SULFASALAZINE (AZULFIDINE) 500 MG TABLET    Take 1 tablet by mouth 2 times daily for 7 days, THEN 2 tablets 2 times daily for 21 days. 10/17/2022    Electronically signed by Chiara Acosta DO on 6/29/22 at 2:53 PM EDT  Please contact the office if you have any questions or change of symptoms.

## 2022-11-03 LAB
ALBUMIN SERPL-MCNC: 3.9 G/DL
ALP BLD-CCNC: 48 U/L
ALT SERPL-CCNC: 16 U/L
ANION GAP SERPL CALCULATED.3IONS-SCNC: 9 MMOL/L
AST SERPL-CCNC: 19 U/L
BASOPHILS ABSOLUTE: 0.02 /ΜL
BASOPHILS RELATIVE PERCENT: 0.3 %
BILIRUB SERPL-MCNC: 0.5 MG/DL (ref 0.1–1.4)
BUN BLDV-MCNC: 13 MG/DL
CALCIUM SERPL-MCNC: 8.7 MG/DL
CHLORIDE BLD-SCNC: 105 MMOL/L
CO2: 27 MMOL/L
CREAT SERPL-MCNC: 0.8 MG/DL
EOSINOPHILS ABSOLUTE: 0.18 /ΜL
EOSINOPHILS RELATIVE PERCENT: 2.9 %
GFR CALCULATED: 91
GLUCOSE BLD-MCNC: 84 MG/DL
HCT VFR BLD CALC: 38.2 % (ref 36–46)
HEMOGLOBIN: 12.7 G/DL (ref 12–16)
LYMPHOCYTES ABSOLUTE: 2.53 /ΜL
LYMPHOCYTES RELATIVE PERCENT: 40.5 %
MCH RBC QN AUTO: 29.3 PG
MCHC RBC AUTO-ENTMCNC: 33.2 G/DL
MCV RBC AUTO: 88.2 FL
MONOCYTES ABSOLUTE: 0.5 /ΜL
MONOCYTES RELATIVE PERCENT: 8 %
NEUTROPHILS ABSOLUTE: 3.01 /ΜL
NEUTROPHILS RELATIVE PERCENT: 48.1 %
PDW BLD-RTO: 37.6 %
PLATELET # BLD: 173 K/ΜL
PMV BLD AUTO: 10.2 FL
POTASSIUM SERPL-SCNC: 3.8 MMOL/L
RBC # BLD: 4.33 10^6/ΜL
SEDIMENTATION RATE, ERYTHROCYTE: 0.3
SODIUM BLD-SCNC: 137 MMOL/L
TOTAL PROTEIN: 7.1
WBC # BLD: 6.25 10^3/ML

## 2022-11-08 ENCOUNTER — TELEPHONE (OUTPATIENT)
Dept: RHEUMATOLOGY | Age: 27
End: 2022-11-08

## 2022-11-08 NOTE — TELEPHONE ENCOUNTER
Patient is requesting an email address to send FMLA forms to the office for Dr. Salvador Mcdonnell to fill out for her. I told her I did not have one for Dr. Salvador Mcdonnell. I also was not sure if this is an option or not. I suggested sending them through [employer] but she wasn't sure if she would be able to do that. Patient lives 45 minutes away and said that would be the easiest way for her. I do not mind letting her email them to me and sending them on to the clinical staff. I was not able to reach any of the nurses at this time.   Please advise    [phone]

## 2022-11-14 ENCOUNTER — TELEPHONE (OUTPATIENT)
Dept: RHEUMATOLOGY | Age: 27
End: 2022-11-14

## 2022-11-14 DIAGNOSIS — M05.79 RHEUMATOID ARTHRITIS INVOLVING MULTIPLE SITES WITH POSITIVE RHEUMATOID FACTOR (HCC): ICD-10-CM

## 2022-11-14 RX ORDER — SULFASALAZINE 500 MG/1
1000 TABLET ORAL 2 TIMES DAILY
Qty: 120 TABLET | Refills: 0 | Status: SHIPPED | OUTPATIENT
Start: 2022-11-14

## 2022-11-14 NOTE — TELEPHONE ENCOUNTER
I reached out to Darshana Garcia to verify the patient has had lab orders from Dr. Bailey Gallagher. They stated that the pt on file is Miss Oliver Cruz and is currently using a different last name. I reached out to the patient and she did confirm that she is using her legal name which is Rocio Alexis. She has changed her  last name due to insurance issue. The patient is going by legal name Bettye December.

## 2022-11-22 DIAGNOSIS — M05.79 RHEUMATOID ARTHRITIS INVOLVING MULTIPLE SITES WITH POSITIVE RHEUMATOID FACTOR (HCC): ICD-10-CM

## 2022-11-22 RX ORDER — SULFASALAZINE 500 MG/1
TABLET ORAL
Qty: 98 TABLET | OUTPATIENT
Start: 2022-11-22

## 2023-01-03 ENCOUNTER — OFFICE VISIT (OUTPATIENT)
Dept: RHEUMATOLOGY | Age: 28
End: 2023-01-03
Payer: COMMERCIAL

## 2023-01-03 VITALS
WEIGHT: 148.8 LBS | BODY MASS INDEX: 28.09 KG/M2 | HEART RATE: 79 BPM | OXYGEN SATURATION: 95 % | HEIGHT: 61 IN | SYSTOLIC BLOOD PRESSURE: 110 MMHG | DIASTOLIC BLOOD PRESSURE: 64 MMHG

## 2023-01-03 DIAGNOSIS — M72.2 PLANTAR FASCIITIS: ICD-10-CM

## 2023-01-03 DIAGNOSIS — Z51.81 MEDICATION MONITORING ENCOUNTER: ICD-10-CM

## 2023-01-03 DIAGNOSIS — G89.29 CHRONIC MIDLINE LOW BACK PAIN WITH SCIATICA, SCIATICA LATERALITY UNSPECIFIED: ICD-10-CM

## 2023-01-03 DIAGNOSIS — M54.40 CHRONIC MIDLINE LOW BACK PAIN WITH SCIATICA, SCIATICA LATERALITY UNSPECIFIED: ICD-10-CM

## 2023-01-03 DIAGNOSIS — M05.79 RHEUMATOID ARTHRITIS INVOLVING MULTIPLE SITES WITH POSITIVE RHEUMATOID FACTOR (HCC): Primary | ICD-10-CM

## 2023-01-03 PROCEDURE — 99214 OFFICE O/P EST MOD 30 MIN: CPT | Performed by: INTERNAL MEDICINE

## 2023-01-03 PROCEDURE — G8427 DOCREV CUR MEDS BY ELIG CLIN: HCPCS | Performed by: INTERNAL MEDICINE

## 2023-01-03 PROCEDURE — 1036F TOBACCO NON-USER: CPT | Performed by: INTERNAL MEDICINE

## 2023-01-03 PROCEDURE — G8484 FLU IMMUNIZE NO ADMIN: HCPCS | Performed by: INTERNAL MEDICINE

## 2023-01-03 PROCEDURE — G8419 CALC BMI OUT NRM PARAM NOF/U: HCPCS | Performed by: INTERNAL MEDICINE

## 2023-01-03 RX ORDER — AMOXICILLIN 875 MG/1
TABLET, COATED ORAL
COMMUNITY
Start: 2022-12-27

## 2023-01-03 ASSESSMENT — ENCOUNTER SYMPTOMS
GASTROINTESTINAL NEGATIVE: 1
RESPIRATORY NEGATIVE: 1
SHORTNESS OF BREATH: 0
BACK PAIN: 0
EYE REDNESS: 0
ABDOMINAL PAIN: 0
EYE PAIN: 0
EYES NEGATIVE: 1
CONSTIPATION: 0
COUGH: 0
NAUSEA: 0
DIARRHEA: 0
WHEEZING: 0
VOMITING: 0
COLOR CHANGE: 0

## 2023-01-03 NOTE — PROGRESS NOTES
[facility] RHEUMATOLOGY FOLLOW UP NOTE       Date Of Service: 1/3/2023  Provider: Kylie Fisher DO ,   PCP: Ava Aranda MD   Name: Greg Lai   MRN: [id number]        History of Present Illness (HPI)     Chief Complaint   Patient presents with    Follow-up     2 mo f/u, Rheumatoid arthritis involving multiple sites with positive rheumatoid factor ([facility] Pt stated pain 5/10 - Bilateral hands, R index finger. Greg Lai  is a(n)27 y.o. female with a hx of  has a past medical history of Arthritis, rheumatoid ([facility] [address] and Encounter for allergy testing. here for the f/u evaluation of  Rheumatoid arthritis. Off Humira for 2 months given insurance issues. Continue taking the sulfasalazine. Pain predominantly in the hands with recent sprain of the finger with notable increased pain while at work. Pain currently 5 out of 10 and localized. Denies any joint swellingAggravated with weather changes, cold weather , life, work. Alleviating: \"nothing\"    Lower back intermittent     Insomnia for unknown reason. Generalized fatigue,       REVIEW OF SYSTEMS: (ROS)    Review of Systems   Constitutional: Negative. Negative for fatigue, fever and unexpected weight change. HENT: Negative. Negative for congestion and mouth sores. Eyes: Negative. Negative for pain and redness. Respiratory: Negative. Negative for cough, shortness of breath and wheezing. Cardiovascular: Negative. Negative for chest pain and leg swelling. Gastrointestinal: Negative. Negative for abdominal pain, constipation, nausea and vomiting. Endocrine: Negative for polyuria. Genitourinary: Negative. Negative for difficulty urinating, frequency and hematuria. Skin: Negative. Negative for color change and rash. Neurological: Negative. Negative for dizziness, weakness, numbness and headaches. Hematological: Negative. Negative for adenopathy. Does not bruise/bleed easily.    Psychiatric/Behavioral: Negative. Negative for agitation and sleep disturbance. The patient is not nervous/anxious. PmHx:  has a past medical history of Arthritis, rheumatoid ([address] and Encounter for allergy testing. Social History:  reports that she has never smoked. She has never used smokeless tobacco. She reports current alcohol use. She reports that she does not use drugs. No Known Allergies    CURRENT MEDICATIONS      Current Outpatient Medications:     sulfaSALAzine (AZULFIDINE) 500 MG tablet, Take 2 tablets by mouth 2 times daily, Disp: 120 tablet, Rfl: 0    cetirizine (ZYRTEC) 10 MG tablet, Take 10 mg by mouth daily, Disp: , Rfl:     amoxicillin (AMOXIL) 875 MG tablet, , Disp: , Rfl:     Adalimumab (HUMIRA PEN) 40 MG/0.4ML PNKT, Inject 40 mg into the skin every 14 days (Patient not taking: Reported on 1/3/2023), Disp: 2 each, Rfl: 5    fluticasone (FLONASE) 50 MCG/ACT nasal spray, 1 spray by Each Nostril route daily  (Patient not taking: Reported on 2/8/2022), Disp: , Rfl:       PHYSICAL EXAMINATION / OBJECTIVE     Objective:  /64 (Site: Left Upper Arm, Position: Sitting, Cuff Size: Large Adult)   Pulse 79   Ht 5' 0.98\" (1.549 m)   Wt 148 lb 12.8 oz (67.5 kg)   SpO2 95%   BMI 28.13 kg/m²     Physical Exam  Constitutional:       General: She is not in acute distress. Appearance: She is well-developed. She is not diaphoretic. HENT:      Head: Normocephalic and atraumatic. Nose: Nose normal.      Mouth/Throat:      Mouth: Mucous membranes are moist.   Eyes:      General: No scleral icterus. Conjunctiva/sclera: Conjunctivae normal.   Musculoskeletal:      Cervical back: Normal range of motion and neck supple. Lymphadenopathy:      Cervical: No cervical adenopathy. Skin:     General: Skin is warm and dry. Neurological:      Mental Status: She is alert and oriented to person, place, and time.    Psychiatric:         Behavior: Behavior normal.     Musculoskeletal:  Upper extremities: SHOULDERS: , elbows - Non-tender     WRISTS :  tinel right,   HANDS/FINGERS : yin nodules bilat. , tender pips bilat. Lower extremities:  HIPS , knees, ankles, - Non-tender, No swelling    FEET : Non-tender        LABS      Lab Results   Component Value Date    WBC 6.25 11/03/2022    HGB 12.7 11/03/2022    HGB 13.5 07/05/2022    HGB 14.2 05/27/2022    MCV 88.2 11/03/2022    MCHC 33.2 11/03/2022    RDW 37.6 11/03/2022     11/03/2022     07/05/2022     05/27/2022    NEUTROABS 3.01 11/03/2022    LYMPHSABS 2.53 11/03/2022    LYMPHSABS 2.19 07/05/2022    LYMPHSABS 2.33 05/27/2022    EOSABS 0.18 11/03/2022    BASOSABS 0.02 11/03/2022         Chemistry        Component Value Date/Time     11/03/2022 0000    K 3.8 11/03/2022 0000     11/03/2022 0000    CO2 27 11/03/2022 0000    BUN 13 11/03/2022 0000    CREATININE 0.8 11/03/2022 0000        Component Value Date/Time    CALCIUM 8.7 11/03/2022 0000    ALKPHOS 48 11/03/2022 0000    AST 19 11/03/2022 0000    ALT 16 11/03/2022 0000    BILITOT 0.5 11/03/2022 0000            Lab Results   Component Value Date    SEDRATE 0.30 11/03/2022    SEDRATE 6 07/05/2022    SEDRATE 7 05/27/2022    CRP 0.8 07/05/2022    CRP 0.8 05/27/2022    CRP 1.9 04/08/2022       Lab Results   Component Value Date    RF 10.0 05/04/2021         RADIOLOGY / PROCEDURES:       ASSESSMENT/PLAN:     1. Rheumatoid arthritis involving multiple sites with positive rheumatoid factor ([address]    2. Plantar fasciitis    3. Chronic midline low back pain with sciatica, sciatica laterality unspecified    4. Medication monitoring encounter      Rheumatoid arthritis  - cont tender joint and flares.    +RF, + joint tenderness and swelling @ PIP joints, > 6 weeks duration, normal ESR & CRP - MGM and PGM: SLE- prior tx: SSZ (cont dz), azathioprine (cont dz), arava (recurrent ear infection), methtorexate (no relief), Enbrel 50 mg subcu weekly (June 2021 to feb 2022) --- partial response decreased pain and swelling  -  humria 40mg q 2 weeks -- 2/8/2022 --we will check on prior authorization status  -  sulfasalazine 1000mg twice daily. (Oct. 2022)                - alt tx options - orencia, actemra, jimmy inhib,      Paresthesia of hands - + tinel right > left  --- improved  --- emg/ncv w/o abnormalities.  2021.     Low back pain -  + AM stiffness > 60 min, relief w/ movement  -- x-ray  si joint w/o abnormalities.   --Continue close monitoring     Medication monitoring - CBC, CMP, ESR, CRP q 4 weeks   x 2 --overdue for medication monitoring labs  --Repeat TB Gold ordered October 2022.  Awaiting result.      Return in about 3 months (around 4/3/2023).    New Prescriptions    No medications on file       1/3/2023

## 2023-01-03 NOTE — PROGRESS NOTES
[facility] RHEUMATOLOGY FOLLOW UP NOTE     Date Of Service: 1/3/2023  Provider: Alexander Petersen DO , DO  PCP: Daryl Hunter MD   Name: Sandi Pimentel   MRN: [id number]    CHIEF COMPLAINT:    Chief Complaint   Patient presents with    Follow-up     2 mo f/u, Rheumatoid arthritis involving multiple sites with positive rheumatoid factor (HCC).    Pt stated pain 5/10 - Bilateral hands, R index finger.       History of Present Illness (HPI)   Sandi Pimentel  is a(n)27 y.o. female with a hx of  has a past medical history of Arthritis, rheumatoid (HCC) and Encounter for allergy testing.  here for the f/u evaluation of  Rheumatoid arthritis.      Interval hx:   - Patient overall doing okay. Has been off Humira for 2 months due to insurance.   Doing well on Sulfasalazine  Having pain in right index finger after spraining it at work 2-3 weeks ago. Currently 5 out of 10 pain. Nothing seems to help the pain other than rest.        REVIEW OF SYSTEMS: (ROS)    Review of Systems   Constitutional:  Negative for fatigue and fever.   Respiratory:  Negative for cough and shortness of breath.    Cardiovascular:  Negative for chest pain.   Gastrointestinal:  Negative for abdominal pain, diarrhea, nausea and vomiting.   Musculoskeletal:  Positive for joint swelling (right index finger from sprain. Otherwise no swelling). Negative for back pain.   Skin: Negative.    Psychiatric/Behavioral: Negative.           Past Medical History:     has a past medical history of Arthritis, rheumatoid (HCC) and Encounter for allergy testing.    Past Surgical History:     has a past surgical history that includes Wrist arthroscopy (Left, 04/2017) and Nose surgery (05/2021).    Current Medications:      Current Outpatient Medications:     sulfaSALAzine (AZULFIDINE) 500 MG tablet, Take 2 tablets by mouth 2 times daily, Disp: 120 tablet, Rfl: 0    cetirizine (ZYRTEC) 10 MG tablet, Take 10 mg by mouth daily, Disp: , Rfl:     amoxicillin (AMOXIL) 875  MG tablet, , Disp: , Rfl:     Adalimumab (HUMIRA PEN) 40 MG/0.4ML PNKT, Inject 40 mg into the skin every 14 days (Patient not taking: Reported on 1/3/2023), Disp: 2 each, Rfl: 5    fluticasone (FLONASE) 50 MCG/ACT nasal spray, 1 spray by Each Nostril route daily  (Patient not taking: Reported on 2/8/2022), Disp: , Rfl:     Allergies:    Patient has no known allergies.    Social History:     reports that she has never smoked. She has never used smokeless tobacco. She reports current alcohol use. She reports that she does not use drugs.    Family History:   family history includes Arthritis in her father and paternal grandmother; High Blood Pressure in her father; Lupus in her maternal grandmother and paternal grandmother; No Known Problems in her mother.      PHYSICAL EXAMINATION / OBJECTIVE     Objective:  /64 (Site: Left Upper Arm, Position: Sitting, Cuff Size: Large Adult)   Pulse 79   Ht 5' 0.98\" (1.549 m)   Wt 148 lb 12.8 oz (67.5 kg)   SpO2 95%   BMI 28.13 kg/m²     Physical Exam    General Appearance:  AAO x 3 ,  well-developed and well nourished  Head: NCAT  Eyes: No abnormalities.,  Sclera non-icteric,   Ears / Nose:  normal  appearance  ears and nose.  No active drainage from nose.   Mouth:  MMM, ears w/o deformities  Neck: No jugular venous distention, appears symmetric, good ROM  Lymph: no cervical adenopathy   Pulmonary/Chest: CTA bilateral ,  symmetric chest expansion.   Cardiovascular: Normal S1 and S2, NO murmur, rub, gallop  : Deferred   Abd/GI: Deferred   Neurologic: Speech normal, no facial droop,  Skin: NO rash on exposed skin.      Upper extremities:    SHOULDERS Non-Tender ,   ELBOWS Non-Tender,   HANDS/FINGERS: Tender PIPs noted bilaterally with yin nodules      Hips, knees, ankles non-tender with no swelling       LABS      Lab Results   Component Value Date    WBC 6.25 11/03/2022    HGB 12.7 11/03/2022    HGB 13.5 07/05/2022    HGB 14.2 05/27/2022    MCV 88.2 11/03/2022     MCHC 33.2 11/03/2022    RDW 37.6 11/03/2022     11/03/2022     07/05/2022     05/27/2022    NEUTROABS 3.01 11/03/2022    LYMPHSABS 2.53 11/03/2022    LYMPHSABS 2.19 07/05/2022    LYMPHSABS 2.33 05/27/2022    EOSABS 0.18 11/03/2022    BASOSABS 0.02 11/03/2022         Chemistry        Component Value Date/Time     11/03/2022 0000    K 3.8 11/03/2022 0000     11/03/2022 0000    CO2 27 11/03/2022 0000    BUN 13 11/03/2022 0000    CREATININE 0.8 11/03/2022 0000        Component Value Date/Time    CALCIUM 8.7 11/03/2022 0000    ALKPHOS 48 11/03/2022 0000    AST 19 11/03/2022 0000    ALT 16 11/03/2022 0000    BILITOT 0.5 11/03/2022 0000            Lab Results   Component Value Date    SEDRATE 0.30 11/03/2022    SEDRATE 6 07/05/2022    SEDRATE 7 05/27/2022    CRP 0.8 07/05/2022    CRP 0.8 05/27/2022    CRP 1.9 04/08/2022       No results found for: VITD25    No results found for: ANASCRN  No results found for: SSA  No results found for: SSB  No results found for: ANTI-SMITH  No results found for: DSDNAAB   No results found for: ANTIRNP  No results found for: C3, C4  No results found for: CCPAB  Lab Results   Component Value Date    RF 10.0 05/04/2021           RADIOLOGY / PROCEDURES:       ASSESSMENT/PLAN:   1. Rheumatoid arthritis involving multiple sites with positive rheumatoid factor ([facility] [address]  2. Plantar fasciitis  3. Chronic midline low back pain with sciatica, sciatica laterality unspecified  4. Medication monitoring encounter    Continue Humira once insurance issues resolve for RA  Continue Sulfasalazine 1000 mg BID      Return in about 3 months (around 4/3/2023). New Prescriptions    No medications on file           Please contact the office if you have any questions or change of symptoms.

## 2023-01-04 DIAGNOSIS — M05.79 RHEUMATOID ARTHRITIS INVOLVING MULTIPLE SITES WITH POSITIVE RHEUMATOID FACTOR (HCC): ICD-10-CM

## 2023-01-04 RX ORDER — ADALIMUMAB 40MG/0.4ML
40 KIT SUBCUTANEOUS
Qty: 2 EACH | Refills: 5 | Status: ACTIVE | OUTPATIENT
Start: 2023-01-04

## 2023-01-04 NOTE — PROGRESS NOTES
Diagnosis Orders   1.  Rheumatoid arthritis involving multiple sites with positive rheumatoid factor (HCC)  Adalimumab (HUMIRA PEN) 40 MG/0.4ML PNKT

## 2023-01-11 ENCOUNTER — PATIENT MESSAGE (OUTPATIENT)
Dept: RHEUMATOLOGY | Age: 28
End: 2023-01-11

## 2023-01-12 DIAGNOSIS — M05.79 RHEUMATOID ARTHRITIS INVOLVING MULTIPLE SITES WITH POSITIVE RHEUMATOID FACTOR (HCC): Primary | ICD-10-CM

## 2023-01-12 RX ORDER — PREDNISONE 10 MG/1
TABLET ORAL
Qty: 15 TABLET | Refills: 0 | Status: SHIPPED | OUTPATIENT
Start: 2023-01-12 | End: 2023-01-22

## 2023-01-12 NOTE — PROGRESS NOTES
Diagnosis Orders   1. Rheumatoid arthritis involving multiple sites with positive rheumatoid factor (HCC)  predniSONE (DELTASONE) 10 MG tablet        - prednisone for suspected arthritis flare.

## 2023-01-23 DIAGNOSIS — M05.79 RHEUMATOID ARTHRITIS INVOLVING MULTIPLE SITES WITH POSITIVE RHEUMATOID FACTOR (HCC): Primary | ICD-10-CM

## 2023-01-23 RX ORDER — PREDNISONE 1 MG/1
TABLET ORAL
Qty: 40 TABLET | Refills: 0 | Status: SHIPPED | OUTPATIENT
Start: 2023-01-23 | End: 2023-02-08

## 2023-01-23 NOTE — TELEPHONE ENCOUNTER
From: Ryan Molina  To: Dr. Luz Mera: 1/11/2023 3:17 PM EST  Subject: Right index finger     The pain in my index finger is getting worse. Do you want me to get an x ray?

## 2023-02-07 ENCOUNTER — PATIENT MESSAGE (OUTPATIENT)
Dept: RHEUMATOLOGY | Age: 28
End: 2023-02-07

## 2023-02-07 DIAGNOSIS — M05.79 RHEUMATOID ARTHRITIS INVOLVING MULTIPLE SITES WITH POSITIVE RHEUMATOID FACTOR (HCC): ICD-10-CM

## 2023-02-07 RX ORDER — SULFASALAZINE 500 MG/1
TABLET ORAL
Qty: 120 TABLET | Refills: 0 | OUTPATIENT
Start: 2023-02-07

## 2023-02-07 NOTE — TELEPHONE ENCOUNTER
From: Harlo Duane  To: Dr. Pop Smith: 2/7/2023 8:04 AM EST  Subject: Prednisone    This medicine still isnt helping. Im on the single tablet 4 days now. Im not sure what you want me to do.  My hands are super hot and stiff

## 2023-02-07 NOTE — TELEPHONE ENCOUNTER
Pt called but there was no answer. A message was left on the patients voicemail asking them to call back. -- wanting to know   -- if the prednisone provided any relief. -- when was the last dosing of the humira and how many injections she has had since the last evaluation ?

## 2023-02-08 ENCOUNTER — TELEPHONE (OUTPATIENT)
Dept: RHEUMATOLOGY | Age: 28
End: 2023-02-08

## 2023-02-08 LAB
QUANTI TB GOLD PLUS: NORMAL
QUANTI TB1 MINUS NIL: 0.35
QUANTI TB2 MINUS NIL: 0.35
QUANTIFERON MITOGEN: NORMAL
QUANTIFERON NIL: NORMAL

## 2023-02-08 NOTE — TELEPHONE ENCOUNTER
Pt called into the office wanting to schedule an appointment. LVM for the patient to call the office.  She does have a scheduled appointment 4/4/23

## 2023-02-09 ENCOUNTER — OFFICE VISIT (OUTPATIENT)
Dept: RHEUMATOLOGY | Age: 28
End: 2023-02-09
Payer: COMMERCIAL

## 2023-02-09 VITALS
BODY MASS INDEX: 27.68 KG/M2 | WEIGHT: 146.6 LBS | OXYGEN SATURATION: 96 % | SYSTOLIC BLOOD PRESSURE: 126 MMHG | HEIGHT: 61 IN | HEART RATE: 80 BPM | DIASTOLIC BLOOD PRESSURE: 70 MMHG

## 2023-02-09 DIAGNOSIS — M65.4 DE QUERVAIN'S TENOSYNOVITIS, BILATERAL: ICD-10-CM

## 2023-02-09 DIAGNOSIS — M05.79 RHEUMATOID ARTHRITIS INVOLVING MULTIPLE SITES WITH POSITIVE RHEUMATOID FACTOR (HCC): ICD-10-CM

## 2023-02-09 DIAGNOSIS — R20.2 PARESTHESIA OF BOTH HANDS: ICD-10-CM

## 2023-02-09 DIAGNOSIS — M05.79 RHEUMATOID ARTHRITIS INVOLVING MULTIPLE SITES WITH POSITIVE RHEUMATOID FACTOR (HCC): Primary | ICD-10-CM

## 2023-02-09 PROCEDURE — G8484 FLU IMMUNIZE NO ADMIN: HCPCS | Performed by: INTERNAL MEDICINE

## 2023-02-09 PROCEDURE — 20550 NJX 1 TENDON SHEATH/LIGAMENT: CPT | Performed by: INTERNAL MEDICINE

## 2023-02-09 PROCEDURE — G8427 DOCREV CUR MEDS BY ELIG CLIN: HCPCS | Performed by: INTERNAL MEDICINE

## 2023-02-09 PROCEDURE — 1036F TOBACCO NON-USER: CPT | Performed by: INTERNAL MEDICINE

## 2023-02-09 PROCEDURE — G8419 CALC BMI OUT NRM PARAM NOF/U: HCPCS | Performed by: INTERNAL MEDICINE

## 2023-02-09 PROCEDURE — 99214 OFFICE O/P EST MOD 30 MIN: CPT | Performed by: INTERNAL MEDICINE

## 2023-02-09 RX ORDER — CELECOXIB 100 MG/1
100 CAPSULE ORAL DAILY
Qty: 60 CAPSULE | Refills: 3 | Status: SHIPPED | OUTPATIENT
Start: 2023-02-09

## 2023-02-09 RX ORDER — METHYLPREDNISOLONE ACETATE 40 MG/ML
20 INJECTION, SUSPENSION INTRA-ARTICULAR; INTRALESIONAL; INTRAMUSCULAR; SOFT TISSUE ONCE
Status: COMPLETED | OUTPATIENT
Start: 2023-02-09 | End: 2023-02-09

## 2023-02-09 RX ORDER — SULFASALAZINE 500 MG/1
1000 TABLET ORAL 2 TIMES DAILY
Qty: 120 TABLET | Refills: 1 | Status: SHIPPED | OUTPATIENT
Start: 2023-02-09

## 2023-02-09 RX ADMIN — METHYLPREDNISOLONE ACETATE 20 MG: 40 INJECTION, SUSPENSION INTRA-ARTICULAR; INTRALESIONAL; INTRAMUSCULAR; SOFT TISSUE at 16:54

## 2023-02-09 ASSESSMENT — ENCOUNTER SYMPTOMS
SHORTNESS OF BREATH: 0
GASTROINTESTINAL NEGATIVE: 1
EYES NEGATIVE: 1
NAUSEA: 0
EYE REDNESS: 0
EYE PAIN: 0
COUGH: 0
VOMITING: 0
RESPIRATORY NEGATIVE: 1
WHEEZING: 0
ABDOMINAL PAIN: 0
CONSTIPATION: 0
COLOR CHANGE: 0

## 2023-02-09 NOTE — PROGRESS NOTES
[facility] RHEUMATOLOGY FOLLOW UP NOTE       Date Of Service: 2/9/2023  Provider: Lisa Inman DO, DO  PCP: Cecilio Taylor MD   Name: John Paul Bauer   MRN: [id number]        History of Present Illness (HPI)     Chief Complaint   Patient presents with    Follow-up     3 month follow up        John Paul Bauer  is a(n)27 y.o. female with a hx of  has a past medical history of Arthritis, rheumatoid ([address] and Encounter for allergy testing. here for the f/u evaluation of  Rheumatoid arthritis and worsening hand pain. Restart humira 3 weeks ago. Prednisone taper did not help with the hand pain. Currently with moderate severay pain in the right index and left thumb. Pain can increased up to 6/10. Aggravated with weather changes, cold weather , use of hands, and lifting object. Alleviating: \"nothing\"  Generalized fatigue  -- Attempting thumb splinting but taking them off at night b/c they get to hot. REVIEW OF SYSTEMS: (ROS)    Review of Systems   Constitutional: Negative. Negative for fatigue, fever and unexpected weight change. HENT: Negative. Negative for congestion and mouth sores. Eyes: Negative. Negative for pain and redness. Respiratory: Negative. Negative for cough, shortness of breath and wheezing. Cardiovascular: Negative. Negative for chest pain and leg swelling. Gastrointestinal: Negative. Negative for abdominal pain, constipation, nausea and vomiting. Endocrine: Negative for polyuria. Genitourinary: Negative. Negative for difficulty urinating, frequency and hematuria. Skin: Negative. Negative for color change and rash. Neurological: Negative. Negative for dizziness, weakness, numbness and headaches. Hematological: Negative. Negative for adenopathy. Does not bruise/bleed easily. Psychiatric/Behavioral: Negative. Negative for agitation and sleep disturbance. The patient is not nervous/anxious.         PmHx:  has a past medical history of Arthritis, rheumatoid ([facility] [address] and Encounter for allergy testing. Social History:  reports that she has never smoked. She has never used smokeless tobacco. She reports current alcohol use. She reports that she does not use drugs. No Known Allergies    CURRENT MEDICATIONS      Current Outpatient Medications:     Adalimumab (HUMIRA PEN) 40 MG/0.4ML PNKT, Inject 40 mg into the skin every 14 days, Disp: 2 each, Rfl: 5    sulfaSALAzine (AZULFIDINE) 500 MG tablet, Take 2 tablets by mouth 2 times daily, Disp: 120 tablet, Rfl: 0    cetirizine (ZYRTEC) 10 MG tablet, Take 10 mg by mouth daily, Disp: , Rfl:     amoxicillin (AMOXIL) 875 MG tablet, , Disp: , Rfl:       PHYSICAL EXAMINATION / OBJECTIVE     Objective:  /70 (Site: Left Upper Arm, Position: Sitting, Cuff Size: Large Adult)   Pulse 80   Ht 5' 0.98\" (1.549 m)   Wt 146 lb 9.6 oz (66.5 kg)   SpO2 96%   BMI 27.71 kg/m²     Physical Exam  Constitutional:       General: She is not in acute distress. Appearance: She is well-developed. She is not diaphoretic. HENT:      Head: Normocephalic and atraumatic. Nose: Nose normal.      Mouth/Throat:      Mouth: Mucous membranes are moist.   Eyes:      General: No scleral icterus. Conjunctiva/sclera: Conjunctivae normal.   Musculoskeletal:      Cervical back: Normal range of motion and neck supple. Lymphadenopathy:      Cervical: No cervical adenopathy. Skin:     General: Skin is warm and dry. Neurological:      Mental Status: She is alert and oriented to person, place, and time. Psychiatric:         Behavior: Behavior normal.     Musculoskeletal:  Upper extremities:   SHOULDERS: , elbows - Non-tender     WRISTS :  tinel right,   HANDS/FINGERS : yin nodules bilat. , tender pips bilat. . + tender right index finger flexor tendon, and finkelstein test left.             LABS      Lab Results   Component Value Date    WBC 7.97 02/08/2023    HGB 12.9 02/08/2023    HGB 12.7 11/03/2022    HGB 13.5 07/05/2022 MCV 90.7 02/08/2023    MCHC 33.9 02/08/2023    RDW 38.5 02/08/2023     02/08/2023     11/03/2022     07/05/2022    NEUTROABS 4.18 02/08/2023    LYMPHSABS 3.09 02/08/2023    LYMPHSABS 2.53 11/03/2022    LYMPHSABS 2.19 07/05/2022    EOSABS 0.11 02/08/2023    BASOSABS 0.03 02/08/2023         Chemistry        Component Value Date/Time     02/08/2023 0000    K 3.7 02/08/2023 0000     02/08/2023 0000    CO2 26 02/08/2023 0000    BUN 15 02/08/2023 0000    CREATININE 0.70 02/08/2023 0000        Component Value Date/Time    CALCIUM 8.4 02/08/2023 0000    ALKPHOS 39 02/08/2023 0000    AST 25 02/08/2023 0000    ALT 16 02/08/2023 0000    BILITOT 0.3 02/08/2023 0000            Lab Results   Component Value Date    SEDRATE 2 02/08/2023    SEDRATE 0.30 11/03/2022    SEDRATE 6 07/05/2022    CRP 0.8 02/08/2023    CRP 0.8 07/05/2022    CRP 0.8 05/27/2022       Lab Results   Component Value Date    RF 10.0 05/04/2021         RADIOLOGY / PROCEDURES:       ASSESSMENT/PLAN:     1. Rheumatoid arthritis involving multiple sites with positive rheumatoid factor (HCC)    2. De Quervain's tenosynovitis, bilateral    3. Paresthesia of both hands      Rheumatoid arthritis  - cont tender joint and flares. +RF, + joint tenderness and swelling @ PIP joints, > 6 weeks duration, normal ESR & CRP - MGM and PGM: SLE- prior tx: SSZ (cont dz), azathioprine (cont dz), arava (recurrent ear infection), methtorexate (no relief), Enbrel 50 mg subcu weekly (June 2021 to feb 2022) --- partial response decreased pain and swelling    -  humria 40mg q 2 weeks -- 2/8/2022 --we will check on prior authorization status  -  sulfasalazine 1000mg twice daily. (Oct. 2022)                - alt tx options - orencia, actemra, jimmy inhib,    - Right 2nd flexor peritendon injection today b/c of the suspected tenosynovitis.     - start celebex 100mg daily      De Quervain's tenosynovitis: left   - + Finkelstein's test and painful resisted abduction of the thumb  - Discussed trial of thumb splinting/wrist splinting initially  - Information provided provided to patient on treatment options and diagnosis  - Other treatment options discussed included hand therapy referral, referral to  orthopedic surgery, and trinity-tendon injections  - referral to occupational therapy for the bilateral hand pain     Paresthesia of hands - + tinel right > left    --- emg/ncv w/o abnormalities. 2021. --- repeat evaluation ordered given worsening symptoms      Low back pain -  + AM stiffness > 60 min, relief w/ movement  -- x-ray  si joint w/o abnormalities. --Continue close monitoring     Medication monitoring - CBC, CMP, ESR, CRP q 4 weeks   x 1 --overdue for medication monitoring labs  --Repeat TB Gold ordered October 2022. No follow-ups on file. New Prescriptions    No medications on file       2/9/2023      PROCEDURE NOTE:    2/9/23    Indication: DesQuervain tenosynovitis )left),   Right 2nd flexor tendon injection  Location: Right index finger and left  thumb peritendon injection (extensor pollicus brevis & longus)       After consent was obtained, using sterile technique the The right outer thumb was prepped w/ Betadine and Chlorhexidine. Right proximal palmar aspect of the index finger was cleaned with topical chlorhexidine. Ethyl chloride was used as local anesthetic.half cc bupivacaine and 10 mg (0.25 cc) of Depo-Medrol 40 mg/mL were injected and the needle withdrawn. The procedure was well tolerated. The patient is asked to continue to rest the joint for a few more days before resuming regular activities. It may be more painful for the first 1-2 days. Watch for fever, or increased swelling or persistent pain in the joint. Call or return to clinic prn if such symptoms occur or there is failure to improve as anticipated.

## 2023-02-09 NOTE — LETTER
[address] [facility] [address] [facility]  Phone: [phone]  Fax: Amie [address], DO        February 9, 2023    Kirill Robins [address] [address]      Dear Lacie Lindquist: To whom it may concern,    Ms. Felty is currently being seen in the rheumatology clinic for her inflammatory arthritis and has recently had worsening of her symptoms including increased hand pain and limited range of motion. Her medication regimen has been restarted approximately 3 weeks ago and may take up to 1 to 2 months to see partial improvement of her symptoms. Patient seen on 2/9/2023 with injections performed around the thumb and patient referred to occupational therapy given the continued hand pain. Asked the patient to be off work for the next 2 days to allow for healing after the injection    If you have any questions or concerns, please don't hesitate to call.     Sincerely,        Emil CARBALLO, DO

## 2023-02-13 ENCOUNTER — TELEPHONE (OUTPATIENT)
Dept: RHEUMATOLOGY | Age: 28
End: 2023-02-13

## 2023-02-13 NOTE — TELEPHONE ENCOUNTER
Katya Neumann is calling to get her referral for OT that bertrand ordered 02-09 faxed to [facility] in [place], please fax to [phone]. Her last name is Miles through [employer], not [employer]ona. Her insurance still has [employer].

## 2023-02-14 ENCOUNTER — TELEPHONE (OUTPATIENT)
Dept: RHEUMATOLOGY | Age: 28
End: 2023-02-14

## 2023-02-14 DIAGNOSIS — M05.79 RHEUMATOID ARTHRITIS INVOLVING MULTIPLE SITES WITH POSITIVE RHEUMATOID FACTOR (HCC): Primary | ICD-10-CM

## 2023-02-14 DIAGNOSIS — M65.4 DE QUERVAIN'S TENOSYNOVITIS, BILATERAL: ICD-10-CM

## 2023-02-14 NOTE — TELEPHONE ENCOUNTER
Diagnosis Orders   1.  Rheumatoid arthritis involving multiple sites with positive rheumatoid factor ([facility]  [facility] tenosynovitis, bilateral  [address] [facility] [place]

## 2023-02-14 NOTE — TELEPHONE ENCOUNTER
Pt stopped in the office and dropped off further paperwork for FMLA, this was for work capacity test spoke with Paulina Kerr who stated this would need to be completed by PT as we do not have the resources to do a full and in depth eval.pt was informed of this. She also had complaints from the injections she received at her appt earlier this week. Pt stated Lt hand thumb is about the same from prior to injection. Pain is more at thumb base near wrist. Has been using ice, when she was in office there was no redness, swelling or heat in this area however pt stated \"its been red/warm at times on and off\"   Constant dull throbbing pain.      Forms are up at  for pt to , stated she has an eval later this month w/PT

## 2023-02-14 NOTE — TELEPHONE ENCOUNTER
She needs a letter stating what duties she is able to perform at work due to her condition.  Ilan Edouard duty)

## 2023-02-14 NOTE — TELEPHONE ENCOUNTER
The pt was seen in the office on Feb 9. She is asking if she needs to be put on any type of restrictions for her job until the medication starts to work. She is an  at [employer].   Please advise

## 2023-02-15 NOTE — TELEPHONE ENCOUNTER
Pt called in needing the work slip faxed. It has been sent. I did ask if she would like to  her FMLA forms, she stated she will pick them up at another time.

## 2023-02-16 NOTE — TELEPHONE ENCOUNTER
[address] [place] [address]  phoned in regarding the letter that was sent 2/15/23 for work restrictions. They stated they need a more vague description on what her restrictions are. Ramiro Romeo was told she would have to go through PT in order to see what percentage of grasping she could do.

## 2023-03-01 ENCOUNTER — PROCEDURE VISIT (OUTPATIENT)
Dept: NEUROLOGY | Age: 28
End: 2023-03-01
Payer: COMMERCIAL

## 2023-03-01 ENCOUNTER — TELEPHONE (OUTPATIENT)
Dept: RHEUMATOLOGY | Age: 28
End: 2023-03-01

## 2023-03-01 DIAGNOSIS — M05.79 RHEUMATOID ARTHRITIS INVOLVING MULTIPLE SITES WITH POSITIVE RHEUMATOID FACTOR (HCC): ICD-10-CM

## 2023-03-01 DIAGNOSIS — M65.4 DE QUERVAIN'S TENOSYNOVITIS, BILATERAL: ICD-10-CM

## 2023-03-01 DIAGNOSIS — M79.641 BILATERAL HAND PAIN: ICD-10-CM

## 2023-03-01 DIAGNOSIS — M79.642 BILATERAL HAND PAIN: ICD-10-CM

## 2023-03-01 DIAGNOSIS — R20.0 BILATERAL HAND NUMBNESS: Primary | ICD-10-CM

## 2023-03-01 PROCEDURE — 95886 MUSC TEST DONE W/N TEST COMP: CPT | Performed by: PSYCHIATRY & NEUROLOGY

## 2023-03-01 PROCEDURE — 95911 NRV CNDJ TEST 9-10 STUDIES: CPT | Performed by: PSYCHIATRY & NEUROLOGY

## 2023-03-01 RX ORDER — CELECOXIB 100 MG/1
200 CAPSULE ORAL DAILY
Qty: 60 CAPSULE | Refills: 0 | Status: SHIPPED | OUTPATIENT
Start: 2023-03-01

## 2023-03-01 RX ORDER — CELECOXIB 100 MG/1
100 CAPSULE ORAL DAILY
Qty: 60 CAPSULE | Refills: 3 | OUTPATIENT
Start: 2023-03-01

## 2023-03-01 NOTE — TELEPHONE ENCOUNTER
Pt wanted to get a refill of celebrex at 200 mg. Reading through my chart messages, the provider only wanted to increase for the next week and to update if there was no relief.

## 2023-03-01 NOTE — TELEPHONE ENCOUNTER
DONV=02-09-23. DONV=04-04-23. Elissa Donohue is calling to get a rf on her Celebrex 200 mg Qd, was increased from 100 mg, she has refills on the 100 mg, but needs a rx for 200 mg. She uses [facility] in [place].

## 2023-03-13 ENCOUNTER — TELEPHONE (OUTPATIENT)
Dept: RHEUMATOLOGY | Age: 28
End: 2023-03-13

## 2023-03-13 LAB
ALBUMIN SERPL-MCNC: 4.3 G/DL
ALP BLD-CCNC: 45 U/L
ALT SERPL-CCNC: 18 U/L
ANION GAP SERPL CALCULATED.3IONS-SCNC: 7 MMOL/L
AST SERPL-CCNC: 28 U/L
BASOPHILS ABSOLUTE: 0.03 /ΜL
BASOPHILS RELATIVE PERCENT: 0.4 %
BILIRUB SERPL-MCNC: 0.5 MG/DL (ref 0.1–1.4)
BUN BLDV-MCNC: 14 MG/DL
C-REACTIVE PROTEIN: 5.3
CALCIUM SERPL-MCNC: 8.5 MG/DL
CHLORIDE BLD-SCNC: 106 MMOL/L
CO2: 27 MMOL/L
CREAT SERPL-MCNC: 0.7 MG/DL
EGFR: 107
EOSINOPHILS ABSOLUTE: 0.1 /ΜL
EOSINOPHILS RELATIVE PERCENT: 1.4 %
GLUCOSE BLD-MCNC: 81 MG/DL
HCT VFR BLD CALC: 41.9 % (ref 36–46)
HEMOGLOBIN: 14 G/DL (ref 12–16)
LYMPHOCYTES ABSOLUTE: 2.82 /ΜL
LYMPHOCYTES RELATIVE PERCENT: 38.3 %
MCH RBC QN AUTO: 29.9 PG
MCHC RBC AUTO-ENTMCNC: 33.4 G/DL
MCV RBC AUTO: 89.3 FL
MONOCYTES ABSOLUTE: 0.38 /ΜL
MONOCYTES RELATIVE PERCENT: 5.2 %
NEUTROPHILS ABSOLUTE: 4.01 /ΜL
NEUTROPHILS RELATIVE PERCENT: 54.3 %
PDW BLD-RTO: 37.1 %
PLATELET # BLD: 189 K/ΜL
PMV BLD AUTO: 10 FL
POTASSIUM SERPL-SCNC: 3.5 MMOL/L
QUANTI TB GOLD PLUS: NEGATIVE
QUANTI TB1 MINUS NIL: 0.35
QUANTI TB2 MINUS NIL: 0.35
QUANTIFERON MITOGEN: NORMAL
QUANTIFERON NIL: NORMAL
RBC # BLD: 4.69 10^6/ΜL
SEDIMENTATION RATE, ERYTHROCYTE: 5
SODIUM BLD-SCNC: 137 MMOL/L
TOTAL PROTEIN: 7.5
WBC # BLD: 7.37 10^3/ML

## 2023-03-13 NOTE — TELEPHONE ENCOUNTER
Pt phoned in regarding results from nerve conduction test. She understood the results and will call back if she has no improvement.

## 2023-03-15 DIAGNOSIS — M05.79 RHEUMATOID ARTHRITIS INVOLVING MULTIPLE SITES WITH POSITIVE RHEUMATOID FACTOR (HCC): ICD-10-CM

## 2023-03-15 RX ORDER — SULFASALAZINE 500 MG/1
1000 TABLET ORAL 2 TIMES DAILY
Qty: 120 TABLET | Refills: 1 | Status: SHIPPED | OUTPATIENT
Start: 2023-03-15

## 2023-03-23 ENCOUNTER — PATIENT MESSAGE (OUTPATIENT)
Dept: RHEUMATOLOGY | Age: 28
End: 2023-03-23

## 2023-03-23 DIAGNOSIS — R53.83 OTHER FATIGUE: Primary | ICD-10-CM

## 2023-03-23 LAB — TSH SERPL DL<=0.05 MIU/L-ACNC: 1.39 UIU/ML

## 2023-03-23 NOTE — TELEPHONE ENCOUNTER
Diagnosis Orders   1. Other fatigue  TSH with Reflex        - recent cbc, cmp wnl  - ? Medication related.    - evaluation of thyroid ordered

## 2023-03-23 NOTE — TELEPHONE ENCOUNTER
From: Bree Reyes  To: Dr. Shawanda Cao: 3/23/2023 5:52 AM EDT  Subject: Fatigue    Is it normal to feel super fatigued? Its gotten so bad that I can sleep for a full night 8:30p-4:30a that Im still falling asleep at the wheel. In the morning and afternoon when I get off work.

## 2023-04-04 ENCOUNTER — OFFICE VISIT (OUTPATIENT)
Dept: RHEUMATOLOGY | Age: 28
End: 2023-04-04
Payer: COMMERCIAL

## 2023-04-04 VITALS
OXYGEN SATURATION: 99 % | SYSTOLIC BLOOD PRESSURE: 118 MMHG | HEART RATE: 87 BPM | WEIGHT: 145.2 LBS | BODY MASS INDEX: 27.41 KG/M2 | DIASTOLIC BLOOD PRESSURE: 66 MMHG | HEIGHT: 61 IN

## 2023-04-04 DIAGNOSIS — H93.8X3 EAR CONGESTION, BILATERAL: Primary | ICD-10-CM

## 2023-04-04 DIAGNOSIS — M65.4 DE QUERVAIN'S TENOSYNOVITIS, BILATERAL: ICD-10-CM

## 2023-04-04 DIAGNOSIS — R20.2 PARESTHESIA OF BOTH HANDS: ICD-10-CM

## 2023-04-04 DIAGNOSIS — M05.79 RHEUMATOID ARTHRITIS INVOLVING MULTIPLE SITES WITH POSITIVE RHEUMATOID FACTOR (HCC): ICD-10-CM

## 2023-04-04 DIAGNOSIS — J34.89 NASAL SORE: ICD-10-CM

## 2023-04-04 LAB
BILIRUBIN, URINE: NEGATIVE
BLOOD, URINE: POSITIVE
CLARITY: CLEAR
COLOR: YELLOW
GLUCOSE URINE: 500
KETONES, URINE: POSITIVE
LEUKOCYTE ESTERASE, URINE: NEGATIVE
NITRITE, URINE: NEGATIVE
PH UA: 6 (ref 4.5–8)
PROTEIN UA: NEGATIVE
SPECIFIC GRAVITY, URINE: 1.02
UROBILINOGEN, URINE: NORMAL

## 2023-04-04 PROCEDURE — G8419 CALC BMI OUT NRM PARAM NOF/U: HCPCS | Performed by: INTERNAL MEDICINE

## 2023-04-04 PROCEDURE — 1036F TOBACCO NON-USER: CPT | Performed by: INTERNAL MEDICINE

## 2023-04-04 PROCEDURE — G8427 DOCREV CUR MEDS BY ELIG CLIN: HCPCS | Performed by: INTERNAL MEDICINE

## 2023-04-04 PROCEDURE — 99214 OFFICE O/P EST MOD 30 MIN: CPT | Performed by: INTERNAL MEDICINE

## 2023-04-04 RX ORDER — ETONOGESTREL AND ETHINYL ESTRADIOL .12; .015 MG/D; MG/D
RING VAGINAL
COMMUNITY
Start: 2023-03-13

## 2023-04-04 RX ORDER — TRIAMCINOLONE ACETONIDE 1 MG/G
CREAM TOPICAL
COMMUNITY
Start: 2023-04-03

## 2023-04-04 RX ORDER — PREDNISONE 20 MG/1
TABLET ORAL
COMMUNITY
Start: 2023-04-03

## 2023-04-04 ASSESSMENT — ENCOUNTER SYMPTOMS
CONSTIPATION: 0
GASTROINTESTINAL NEGATIVE: 1
VOMITING: 0
COUGH: 0
COLOR CHANGE: 0
EYE REDNESS: 0
SHORTNESS OF BREATH: 0
EYES NEGATIVE: 1
RESPIRATORY NEGATIVE: 1
WHEEZING: 0
ABDOMINAL PAIN: 0
NAUSEA: 0
EYE PAIN: 0

## 2023-04-04 NOTE — PROGRESS NOTES
is well-developed. She is not diaphoretic. HENT:      Head: Normocephalic and atraumatic. Comments: Tender maxillary sinuses     Right Ear: Tympanic membrane normal.      Left Ear: Tympanic membrane normal.      Ears:      Comments: Red ear canals bilat     Nose: Nose normal.      Comments: Small scabbed lesion left nasal septum     Mouth/Throat:      Mouth: Mucous membranes are moist.   Eyes:      General: No scleral icterus. Conjunctiva/sclera: Conjunctivae normal.      Pupils: Pupils are equal, round, and reactive to light. Cardiovascular:      Rate and Rhythm: Normal rate. Heart sounds: No murmur heard. Pulmonary:      Effort: Pulmonary effort is normal.      Breath sounds: Normal breath sounds. Musculoskeletal:      Cervical back: Normal range of motion and neck supple. Comments: Feet: Tender left foot  Wrist:  left wrists. Hands: + finkelstein test    Lymphadenopathy:      Cervical: No cervical adenopathy. Skin:     General: Skin is warm and dry. Comments: Redness along the left cmc region. Sparse - sub mm red spots along the arms, right proximal posteriomedial thigh and left prox anterior thigh. Neurological:      Mental Status: She is alert and oriented to person, place, and time. Psychiatric:         Behavior: Behavior normal.     Musculoskeletal:  Upper extremities:   SHOULDERS: , elbows - Non-tender     WRISTS :  tinel right,   HANDS/FINGERS : yin nodules bilat. finkelstein test left.  + finkelstein test,  tender left cmc, left wrist.       Right posterior/medial thigh       Right volar aspect of wrist.          LABS      Lab Results   Component Value Date    WBC 7.37 03/13/2023    HGB 14.0 03/13/2023    HGB 12.9 02/08/2023    HGB 12.7 11/03/2022    MCV 89.3 03/13/2023    MCHC 33.4 03/13/2023    RDW 37.1 03/13/2023     03/13/2023     02/08/2023     11/03/2022    NEUTROABS 4.01 03/13/2023    LYMPHSABS 2.82 03/13/2023    LYMPHSABS 3.09

## 2023-04-05 ENCOUNTER — TELEPHONE (OUTPATIENT)
Dept: RHEUMATOLOGY | Age: 28
End: 2023-04-05

## 2023-04-05 DIAGNOSIS — R81 GLUCOSURIA: Primary | ICD-10-CM

## 2023-04-05 NOTE — TELEPHONE ENCOUNTER
----- Message from Eladio Coles DO sent at 4/4/2023  4:19 PM EDT -----  Please send my most recent note to the patient's occupational therapist

## 2023-04-06 ENCOUNTER — PATIENT MESSAGE (OUTPATIENT)
Dept: RHEUMATOLOGY | Age: 28
End: 2023-04-06

## 2023-04-06 LAB
AVERAGE GLUCOSE: 88
HBA1C MFR BLD: 4.7 %

## 2023-04-06 NOTE — TELEPHONE ENCOUNTER
From: Johanna Storm  To: Dr. Weinstein Red: 4/6/2023 2:06 PM EDT  Subject: Pt    Do you want me to continue pt? If so same, less or more times a week?

## 2023-04-06 NOTE — TELEPHONE ENCOUNTER
From: Von Gonzalez  To: Dr. Jaren Enriquez: 4/6/2023 9:28 AM EDT  Subject: Question regarding URINALYSIS AUTO ONLY    So regarding the results what now?

## 2023-05-09 LAB
ALBUMIN SERPL-MCNC: 4.2 G/DL
ALP BLD-CCNC: 51 U/L
ALT SERPL-CCNC: 19 U/L
ANION GAP SERPL CALCULATED.3IONS-SCNC: 11 MMOL/L
AST SERPL-CCNC: 29 U/L
BASOPHILS ABSOLUTE: 0.02 /ΜL
BASOPHILS RELATIVE PERCENT: 0.4 %
BILIRUB SERPL-MCNC: 0.8 MG/DL (ref 0.1–1.4)
BUN BLDV-MCNC: 12 MG/DL
C-REACTIVE PROTEIN: NORMAL
CALCIUM SERPL-MCNC: 9.2 MG/DL
CHLORIDE BLD-SCNC: 104 MMOL/L
CO2: 24 MMOL/L
CREAT SERPL-MCNC: 0.7 MG/DL
EGFR: 107
EOSINOPHILS ABSOLUTE: 0.02 /ΜL
EOSINOPHILS RELATIVE PERCENT: 0.4 %
GLUCOSE BLD-MCNC: 84 MG/DL
HCT VFR BLD CALC: 39.4 % (ref 36–46)
HEMOGLOBIN: 13.1 G/DL (ref 12–16)
LYMPHOCYTES ABSOLUTE: 1.76 /ΜL
LYMPHOCYTES RELATIVE PERCENT: 34 %
MCH RBC QN AUTO: 30 PG
MCHC RBC AUTO-ENTMCNC: 33.2 G/DL
MCV RBC AUTO: 90.4 FL
MONOCYTES ABSOLUTE: 0.37 /ΜL
MONOCYTES RELATIVE PERCENT: 7.2 %
NEUTROPHILS ABSOLUTE: 2.99 /ΜL
NEUTROPHILS RELATIVE PERCENT: 57.8 %
PDW BLD-RTO: 37.6 %
PLATELET # BLD: 164 K/ΜL
PMV BLD AUTO: 9.8 FL
POTASSIUM SERPL-SCNC: 4 MMOL/L
QUANTI TB GOLD PLUS: NEGATIVE
QUANTI TB1 MINUS NIL: 0.35
QUANTI TB2 MINUS NIL: 0.35
QUANTIFERON MITOGEN: NORMAL
QUANTIFERON NIL: NORMAL
RBC # BLD: 4.36 10^6/ΜL
SEDIMENTATION RATE, ERYTHROCYTE: 5
SODIUM BLD-SCNC: 135 MMOL/L
TOTAL PROTEIN: 7.3
WBC # BLD: 5.17 10^3/ML

## 2023-05-10 ENCOUNTER — OFFICE VISIT (OUTPATIENT)
Dept: RHEUMATOLOGY | Age: 28
End: 2023-05-10
Payer: COMMERCIAL

## 2023-05-10 VITALS
OXYGEN SATURATION: 96 % | HEIGHT: 61 IN | BODY MASS INDEX: 27.38 KG/M2 | SYSTOLIC BLOOD PRESSURE: 116 MMHG | DIASTOLIC BLOOD PRESSURE: 74 MMHG | WEIGHT: 145 LBS | HEART RATE: 72 BPM

## 2023-05-10 DIAGNOSIS — M05.79 RHEUMATOID ARTHRITIS INVOLVING MULTIPLE SITES WITH POSITIVE RHEUMATOID FACTOR (HCC): Primary | ICD-10-CM

## 2023-05-10 DIAGNOSIS — M05.79 RHEUMATOID ARTHRITIS INVOLVING MULTIPLE SITES WITH POSITIVE RHEUMATOID FACTOR (HCC): ICD-10-CM

## 2023-05-10 DIAGNOSIS — M65.4 DE QUERVAIN'S TENOSYNOVITIS, BILATERAL: ICD-10-CM

## 2023-05-10 DIAGNOSIS — Z51.81 MEDICATION MONITORING ENCOUNTER: ICD-10-CM

## 2023-05-10 DIAGNOSIS — M54.40 CHRONIC MIDLINE LOW BACK PAIN WITH SCIATICA, SCIATICA LATERALITY UNSPECIFIED: ICD-10-CM

## 2023-05-10 DIAGNOSIS — G89.29 CHRONIC MIDLINE LOW BACK PAIN WITH SCIATICA, SCIATICA LATERALITY UNSPECIFIED: ICD-10-CM

## 2023-05-10 PROCEDURE — G8427 DOCREV CUR MEDS BY ELIG CLIN: HCPCS | Performed by: INTERNAL MEDICINE

## 2023-05-10 PROCEDURE — 1036F TOBACCO NON-USER: CPT | Performed by: INTERNAL MEDICINE

## 2023-05-10 PROCEDURE — G8419 CALC BMI OUT NRM PARAM NOF/U: HCPCS | Performed by: INTERNAL MEDICINE

## 2023-05-10 PROCEDURE — 99214 OFFICE O/P EST MOD 30 MIN: CPT | Performed by: INTERNAL MEDICINE

## 2023-05-10 RX ORDER — FOLIC ACID 1 MG/1
1 TABLET ORAL DAILY
Qty: 90 TABLET | Refills: 1 | Status: SHIPPED | OUTPATIENT
Start: 2023-05-10

## 2023-05-10 RX ORDER — ABATACEPT 125 MG/ML
125 INJECTION, SOLUTION SUBCUTANEOUS WEEKLY
Qty: 3.92 ML | Refills: 2 | Status: ACTIVE | OUTPATIENT
Start: 2023-05-10

## 2023-05-10 RX ORDER — SULFASALAZINE 500 MG/1
1000 TABLET ORAL 2 TIMES DAILY
Qty: 120 TABLET | Refills: 1 | Status: SHIPPED | OUTPATIENT
Start: 2023-05-10

## 2023-05-10 ASSESSMENT — ENCOUNTER SYMPTOMS
EYES NEGATIVE: 1
ABDOMINAL PAIN: 0
CONSTIPATION: 0
EYE PAIN: 0
GASTROINTESTINAL NEGATIVE: 1
VOMITING: 0
COUGH: 0
EYE REDNESS: 0
SHORTNESS OF BREATH: 0
WHEEZING: 0
NAUSEA: 0
RESPIRATORY NEGATIVE: 1
COLOR CHANGE: 0

## 2023-05-10 NOTE — PROGRESS NOTES
[facility] RHEUMATOLOGY FOLLOW UP NOTE       Date Of Service: 5/10/2023  Provider: Leigh Brewer DO , DO  PCP: Talita Rutledge MD   Name: Sarah Borja   MRN: [id number]        History of Present Illness (HPI)     Chief Complaint   Patient presents with    Follow-up     6 week f/u Ear congestion, bilateral    Discuss medications/treatments. Bilateral hands LT wrist/thumb RT pointer pain moves/changes         Sarah Borja  is a(n)27 y.o. female with a hx of  has a past medical history of Arthritis, rheumatoid ([address] and Encounter for allergy testing. here for the f/u evaluation of  Rheumatoid arthritis and worsening hand pain. Worsening pain in the hands, thumbs, wrists. Pain up to 7.5/10 over the past week. Timing: no particular timing. Aggravating: increased use, bumping the hands. Stress. Alleviating: none. Rash - improved since the las evaluation - scheduled for derm evaluation in the next week. No longer taking claritin or zyrtec 2 day ago. Scheduled with allergy/immunology next week. Generalized fatigue      REVIEW OF SYSTEMS: (ROS)    Review of Systems   Constitutional: Negative. Negative for fatigue, fever and unexpected weight change. HENT:  Positive for congestion and nosebleeds. Negative for mouth sores. Eyes: Negative. Negative for pain and redness. Respiratory: Negative. Negative for cough, shortness of breath and wheezing. Cardiovascular: Negative. Negative for chest pain and leg swelling. Gastrointestinal: Negative. Negative for abdominal pain, constipation, nausea and vomiting. Endocrine: Negative for polyuria. Genitourinary: Negative. Negative for difficulty urinating, frequency and hematuria. Skin: Negative. Negative for color change and rash. Neurological: Negative. Negative for dizziness, weakness, numbness and headaches. Hematological: Negative. Negative for adenopathy. Does not bruise/bleed easily. Psychiatric/Behavioral: Negative.

## 2023-05-12 ENCOUNTER — PATIENT MESSAGE (OUTPATIENT)
Dept: RHEUMATOLOGY | Age: 28
End: 2023-05-12

## 2023-05-12 DIAGNOSIS — M05.79 RHEUMATOID ARTHRITIS INVOLVING MULTIPLE SITES WITH POSITIVE RHEUMATOID FACTOR (HCC): Primary | ICD-10-CM

## 2023-05-12 RX ORDER — PREDNISONE 10 MG/1
TABLET ORAL
Qty: 15 TABLET | Refills: 0 | Status: SHIPPED | OUTPATIENT
Start: 2023-05-12 | End: 2023-05-22

## 2023-05-12 NOTE — TELEPHONE ENCOUNTER
From: Cherylynn Olszewski  To: Dr. Al Drafts: 5/12/2023 10:17 AM EDT  Subject: Update    My hand is really swollen today, burns really badly when I attempt at getting it to work the way I want it to, almost like its on fire internally. Also the dermatologist said there wasnt anything she could do and to call back if the rash returns. So no answers.

## 2023-05-19 DIAGNOSIS — M65.4 DE QUERVAIN'S TENOSYNOVITIS, BILATERAL: ICD-10-CM

## 2023-05-19 DIAGNOSIS — M05.79 RHEUMATOID ARTHRITIS INVOLVING MULTIPLE SITES WITH POSITIVE RHEUMATOID FACTOR (HCC): ICD-10-CM

## 2023-06-19 ENCOUNTER — OFFICE VISIT (OUTPATIENT)
Dept: RHEUMATOLOGY | Age: 28
End: 2023-06-19
Payer: COMMERCIAL

## 2023-06-19 VITALS
DIASTOLIC BLOOD PRESSURE: 74 MMHG | HEART RATE: 97 BPM | SYSTOLIC BLOOD PRESSURE: 122 MMHG | HEIGHT: 61 IN | OXYGEN SATURATION: 96 % | WEIGHT: 145.06 LBS | BODY MASS INDEX: 27.39 KG/M2

## 2023-06-19 DIAGNOSIS — Z51.81 MEDICATION MONITORING ENCOUNTER: ICD-10-CM

## 2023-06-19 DIAGNOSIS — G89.29 CHRONIC MIDLINE LOW BACK PAIN WITH SCIATICA, SCIATICA LATERALITY UNSPECIFIED: ICD-10-CM

## 2023-06-19 DIAGNOSIS — M05.79 RHEUMATOID ARTHRITIS INVOLVING MULTIPLE SITES WITH POSITIVE RHEUMATOID FACTOR (HCC): Primary | ICD-10-CM

## 2023-06-19 DIAGNOSIS — M54.40 CHRONIC MIDLINE LOW BACK PAIN WITH SCIATICA, SCIATICA LATERALITY UNSPECIFIED: ICD-10-CM

## 2023-06-19 PROCEDURE — 1036F TOBACCO NON-USER: CPT | Performed by: INTERNAL MEDICINE

## 2023-06-19 PROCEDURE — G8419 CALC BMI OUT NRM PARAM NOF/U: HCPCS | Performed by: INTERNAL MEDICINE

## 2023-06-19 PROCEDURE — G8427 DOCREV CUR MEDS BY ELIG CLIN: HCPCS | Performed by: INTERNAL MEDICINE

## 2023-06-19 PROCEDURE — 99214 OFFICE O/P EST MOD 30 MIN: CPT | Performed by: INTERNAL MEDICINE

## 2023-06-19 ASSESSMENT — ENCOUNTER SYMPTOMS
ABDOMINAL PAIN: 0
EYES NEGATIVE: 1
GASTROINTESTINAL NEGATIVE: 1
COLOR CHANGE: 0
COUGH: 0
WHEEZING: 0
EYE PAIN: 0
SHORTNESS OF BREATH: 0
VOMITING: 0
EYE REDNESS: 0
NAUSEA: 0
RESPIRATORY NEGATIVE: 1
CONSTIPATION: 0

## 2023-06-19 NOTE — PROGRESS NOTES
02/08/2023       Lab Results   Component Value Date    RF 10.0 05/04/2021         RADIOLOGY / PROCEDURES:       ASSESSMENT/PLAN:     No diagnosis found. Rheumatoid arthritis  - cont tender joint and flares. +RF(14) - repeat negative. , + joint tenderness and swelling @ PIP joints, > 6 weeks duration, normal ESR & CRP - MGM and PGM: SLE   prior tx: SSZ (cont dz), azathioprine (cont dz), arava (recurrent ear infection), methtorexate (no relief), Enbrel 50 mg subcu weekly (June 2021 to feb 2022) --- partial response decreased pain and swelling, humira -- no relief (feb 8, 2022- to may 2023)   Restart Sulfasalazine 1000mg twice daily. (Oct. 2022 to may  2023)    Restart Methotrexate 10SP po weekly + folic acid. (May 2023)   orencai 125mg q 4 weeks. (May 2023 w/ improvement)   Celebrex 100mg daily  Alt tx options - orencia, actemra, jimmy inhib,      De Quervain's tenosynovitis: bilateral   -- negative --   s/p . Occupationanl therapy w/o sig. Relief,. , thumb splinting w/o relief. Paresthesia of hands - + tinel right > left   - mostly resolved.    emg/ncv w/o abnormalities. 2023 w/o abnormalities. Rash -- improved  -- diagnosed w/ textile dyes. -- non-blanchable red lesions along the arms, and proximal thighs. No new soaps , detergents. + new pet dog Feb 2023    Medication monitoring - CBC, CMP, ESR, CRP q 4 weeks   x 2        Return in about 2 months (around 8/19/2023).     New Prescriptions    No medications on file       6/19/2023

## 2023-06-22 ENCOUNTER — HOSPITAL ENCOUNTER (OUTPATIENT)
Dept: MRI IMAGING | Age: 28
Discharge: HOME | End: 2023-06-22
Attending: RADIOLOGY

## 2023-06-22 DIAGNOSIS — Z00.6 EXAMINATION FOR NORMAL COMPARISON FOR CLINICAL RESEARCH: ICD-10-CM

## 2023-06-26 ENCOUNTER — TELEPHONE (OUTPATIENT)
Dept: RHEUMATOLOGY | Age: 28
End: 2023-06-26

## 2023-07-12 LAB
ALBUMIN SERPL-MCNC: 4.3 G/DL
ALBUMIN SERPL-MCNC: NORMAL G/DL
ALP BLD-CCNC: 60 U/L
ALP BLD-CCNC: NORMAL U/L
ALT SERPL-CCNC: 43 U/L
ALT SERPL-CCNC: NORMAL U/L
ANION GAP SERPL CALCULATED.3IONS-SCNC: 10 MMOL/L
ANION GAP SERPL CALCULATED.3IONS-SCNC: NORMAL MMOL/L
AST SERPL-CCNC: 41 U/L
AST SERPL-CCNC: NORMAL U/L
BASOPHILS ABSOLUTE: 0.2 /ΜL
BASOPHILS RELATIVE PERCENT: 0.3 %
BILIRUB SERPL-MCNC: 0.5 MG/DL (ref 0.1–1.4)
BILIRUB SERPL-MCNC: NORMAL MG/DL
BUN BLDV-MCNC: 12 MG/DL
BUN BLDV-MCNC: NORMAL MG/DL
C-REACTIVE PROTEIN: 5
CALCIUM SERPL-MCNC: 8.9 MG/DL
CALCIUM SERPL-MCNC: NORMAL MG/DL
CHLORIDE BLD-SCNC: 103 MMOL/L
CHLORIDE BLD-SCNC: NORMAL MMOL/L
CO2: 27 MMOL/L
CO2: NORMAL
CREAT SERPL-MCNC: 0.8 MG/DL
CREAT SERPL-MCNC: NORMAL MG/DL
EGFR: 91
EGFR: NORMAL
EOSINOPHILS ABSOLUTE: 0.5 /ΜL
EOSINOPHILS RELATIVE PERCENT: 0.8 %
GLUCOSE BLD-MCNC: 148 MG/DL
GLUCOSE BLD-MCNC: NORMAL MG/DL
HCT VFR BLD CALC: 42.7 % (ref 36–46)
HEMOGLOBIN: 14.3 G/DL (ref 12–16)
LYMPHOCYTES ABSOLUTE: 2.26 /ΜL
LYMPHOCYTES RELATIVE PERCENT: 36.7 %
MCH RBC QN AUTO: 29.3 PG
MCHC RBC AUTO-ENTMCNC: 33.5 G/DL
MCV RBC AUTO: 87.5 FL
MONOCYTES ABSOLUTE: 0.45 /ΜL
MONOCYTES RELATIVE PERCENT: 7.3 %
NEUTROPHILS ABSOLUTE: 3.36 /ΜL
NEUTROPHILS RELATIVE PERCENT: 54.7 %
PDW BLD-RTO: 36.7 %
PLATELET # BLD: 184 K/ΜL
PMV BLD AUTO: 10.1 FL
POTASSIUM SERPL-SCNC: 3.3 MMOL/L
POTASSIUM SERPL-SCNC: NORMAL MMOL/L
RBC # BLD: 4.88 10^6/ΜL
SEDIMENTATION RATE, ERYTHROCYTE: 6
SODIUM BLD-SCNC: 137 MMOL/L
SODIUM BLD-SCNC: NORMAL MMOL/L
TOTAL PROTEIN: 7.9
TOTAL PROTEIN: NORMAL
WBC # BLD: 6.15 10^3/ML

## 2023-07-13 DIAGNOSIS — M05.79 RHEUMATOID ARTHRITIS INVOLVING MULTIPLE SITES WITH POSITIVE RHEUMATOID FACTOR (HCC): ICD-10-CM

## 2023-07-13 RX ORDER — SULFASALAZINE 500 MG/1
500 TABLET ORAL 2 TIMES DAILY
Qty: 60 TABLET | Refills: 0 | Status: SHIPPED | OUTPATIENT
Start: 2023-07-13

## 2023-07-13 NOTE — RESULT ENCOUNTER NOTE
Lab testing revealing a mildly elevated blood sugar, liver function test and a low potassium level.   Please decrease the sulfasalazine to 1 tablet twice daily and have your labs repeated in 2 to 3 weeks

## 2023-08-10 DIAGNOSIS — M05.79 RHEUMATOID ARTHRITIS INVOLVING MULTIPLE SITES WITH POSITIVE RHEUMATOID FACTOR (HCC): ICD-10-CM

## 2023-08-10 RX ORDER — ABATACEPT 125 MG/ML
INJECTION, SOLUTION SUBCUTANEOUS
Qty: 4 ML | Refills: 2 | Status: ACTIVE | OUTPATIENT
Start: 2023-08-10